# Patient Record
Sex: FEMALE | Race: WHITE | NOT HISPANIC OR LATINO | Employment: UNEMPLOYED | URBAN - METROPOLITAN AREA
[De-identification: names, ages, dates, MRNs, and addresses within clinical notes are randomized per-mention and may not be internally consistent; named-entity substitution may affect disease eponyms.]

---

## 2018-03-31 ENCOUNTER — APPOINTMENT (OUTPATIENT)
Dept: GENERAL RADIOLOGY | Facility: HOSPITAL | Age: 38
End: 2018-03-31

## 2018-03-31 ENCOUNTER — HOSPITAL ENCOUNTER (EMERGENCY)
Facility: HOSPITAL | Age: 38
Discharge: HOME | End: 2018-03-31
Attending: EMERGENCY MEDICINE | Admitting: EMERGENCY MEDICINE

## 2018-03-31 VITALS
BODY MASS INDEX: 41.72 KG/M2 | RESPIRATION RATE: 18 BRPM | DIASTOLIC BLOOD PRESSURE: 92 MMHG | WEIGHT: 221 LBS | TEMPERATURE: 99.1 F | OXYGEN SATURATION: 99 % | SYSTOLIC BLOOD PRESSURE: 142 MMHG | HEIGHT: 61 IN | HEART RATE: 97 BPM

## 2018-03-31 DIAGNOSIS — S82.891A CLOSED FRACTURE OF RIGHT ANKLE, INITIAL ENCOUNTER: ICD-10-CM

## 2018-03-31 DIAGNOSIS — S82.892A CLOSED FRACTURE OF LEFT ANKLE, INITIAL ENCOUNTER: Primary | ICD-10-CM

## 2018-03-31 PROCEDURE — 73610 X-RAY EXAM OF ANKLE: CPT

## 2018-03-31 PROCEDURE — 99284 EMERGENCY DEPT VISIT MOD MDM: CPT

## 2018-03-31 RX ORDER — ASCORBIC ACID 500 MG
1 TABLET ORAL DAILY
COMMUNITY
End: 2018-04-04

## 2018-03-31 RX ORDER — HYDROCODONE BITARTRATE AND ACETAMINOPHEN 5; 325 MG/1; MG/1
1 TABLET ORAL EVERY 6 HOURS PRN
Qty: 15 TABLET | Refills: 0 | Status: SHIPPED | OUTPATIENT
Start: 2018-03-31 | End: 2018-04-03 | Stop reason: SDUPTHER

## 2018-04-03 ENCOUNTER — TELEPHONE (OUTPATIENT)
Dept: ORTHOPEDIC SURGERY | Facility: CLINIC | Age: 38
End: 2018-04-03

## 2018-04-03 ENCOUNTER — OFFICE VISIT (OUTPATIENT)
Dept: ORTHOPEDIC SURGERY | Facility: CLINIC | Age: 38
End: 2018-04-03

## 2018-04-03 VITALS
TEMPERATURE: 97.8 F | HEIGHT: 61 IN | BODY MASS INDEX: 40.22 KG/M2 | DIASTOLIC BLOOD PRESSURE: 80 MMHG | WEIGHT: 213 LBS | SYSTOLIC BLOOD PRESSURE: 128 MMHG | HEART RATE: 96 BPM

## 2018-04-03 DIAGNOSIS — S82.62XA CLOSED DISPLACED FRACTURE OF LATERAL MALLEOLUS OF LEFT FIBULA, INITIAL ENCOUNTER: Primary | ICD-10-CM

## 2018-04-03 DIAGNOSIS — S93.432A ANKLE SYNDESMOSIS DISRUPTION, LEFT, INITIAL ENCOUNTER: ICD-10-CM

## 2018-04-03 DIAGNOSIS — S82.61XA CLOSED AVULSION FRACTURE OF LATERAL MALLEOLUS OF RIGHT FIBULA, INITIAL ENCOUNTER: ICD-10-CM

## 2018-04-03 PROCEDURE — 27786 TREATMENT OF ANKLE FRACTURE: CPT | Performed by: ORTHOPAEDIC SURGERY

## 2018-04-03 PROCEDURE — 99204 OFFICE O/P NEW MOD 45 MIN: CPT | Performed by: ORTHOPAEDIC SURGERY

## 2018-04-03 RX ORDER — HYDROCODONE BITARTRATE AND ACETAMINOPHEN 5; 325 MG/1; MG/1
TABLET ORAL
Qty: 60 TABLET | Refills: 0 | Status: SHIPPED | OUTPATIENT
Start: 2018-04-03 | End: 2018-07-16

## 2018-04-03 RX ORDER — CLINDAMYCIN PHOSPHATE 900 MG/50ML
900 INJECTION INTRAVENOUS ONCE
Status: CANCELLED | OUTPATIENT
Start: 2018-04-06 | End: 2018-04-06

## 2018-04-03 NOTE — PROGRESS NOTES
"Patient:  Veena Beyer is a 37 y.o. female    Chief Complaint/ Reason for Visit:    Chief Complaint   Patient presents with   • Left Ankle - Establish Care, Pain, Joint Swelling, Injury   • Right Ankle - Establish Care, Pain, Joint Swelling, Injury       HPI:  This patient presents today, accompanied by her , complaining of bilateral ankle pain.  She says that she was going down the steps to her basement and she missed the last step falling and injuring both of her ankles.  She felt and heard a loud pop in her left ankle, and had the immediate onset of severe pain and swelling in her left ankle.  She also had moderate pain in her right ankle, but can actually bear weight on it, unlike the left ankle.  She was seen in the emergency Department over at [facility] on March 31 and diagnosed with fractures of both ankles.  The right ankle seemed relatively minor and was treated in a fracture splint.  The left ankle was a much more serious injury and she was placed in a well-padded posterior splint.  She still has moderate pain in the left ankle and mild pain in the right ankle.  She has pain with any weightbearing in the right ankle though it's relatively mild.  She has throbbing pain in her left ankle which improves with elevation and rest.  She has been taking pain medicine that she was prescribed and that seems to help the discomfort somewhat.  She does not have a history of any prior serious injury to either of her ankles that she can recall at this time.    The patient denies any calf pain, chest pain, or acute shortness of breath.  She is not having any numbness, tingling, or sensory derangements in her right lower extremity, but does complain of some mild \"numbness\" in the toes of her left foot that she attributes to swelling.    She denies any other musculoskeletal injuries or complaints acutely at this time.  She does not smoke, nor does she take any exogenous hormone's, nor does she have any " "implantable birth control or hormone eluting devices..  She has no history of DVT.    The patient and her  have a 1-year-old child.  The patient says she is not currently pregnant.      PMH:    Past Medical History:   Diagnosis Date   • Ankle fracture, left        PSH:    Past Surgical History:   Procedure Laterality Date   • WISDOM TOOTH EXTRACTION  1997       Social Hx:    Social History     Social History   • Marital status:      Spouse name: N/A   • Number of children: N/A   • Years of education: N/A     Occupational History   • Not on file.     Social History Main Topics   • Smoking status: Never Smoker   • Smokeless tobacco: Not on file   • Alcohol use Yes      Comment: OCC   • Drug use: No   • Sexual activity: Defer     Other Topics Concern   • Not on file     Social History Narrative   • No narrative on file       Family Hx:  History reviewed. No pertinent family history.    Meds:    Current Outpatient Prescriptions:   •  HYDROcodone-acetaminophen (NORCO) 5-325 MG per tablet, May take 1 or 2 by mouth every 6 hours as needed for pain postoperatively., Disp: 60 tablet, Rfl: 0  •  Prenatal Vit-Fe Fumarate-FA (PRENATAL PO), Take  by mouth., Disp: , Rfl:     Allergies:    Allergies   Allergen Reactions   • Penicillins Hives       ROS:  Review of Systems   Constitutional: Positive for activity change.   Respiratory: Negative for shortness of breath.    Musculoskeletal: Positive for arthralgias, gait problem and joint swelling.   Neurological: Positive for numbness.   All other systems reviewed and are negative.      Vitals:    04/03/18 1041   BP: 128/80   BP Location: Left arm   Patient Position: Sitting   Cuff Size: Large Adult   Pulse: 96   Temp: 97.8 °F (36.6 °C)   TempSrc: Temporal Artery    Weight: 96.6 kg (213 lb)   Height: 154.9 cm (61\")   PainSc:   5   PainLoc: Ankle     Body mass index is 40.25 kg/m².    Physical Exam   Constitutional: She is oriented to person, place, and time. She appears " well-developed and well-nourished.   HENT:   Head: Normocephalic and atraumatic.   Mouth/Throat: Oropharynx is clear and moist.   Eyes: Conjunctivae and EOM are normal. Pupils are equal, round, and reactive to light. No scleral icterus.   Neck: Phonation normal. No JVD present. Carotid bruit is not present. No tracheal deviation present.   Cardiovascular: Normal rate, regular rhythm, normal heart sounds and intact distal pulses.    Pulses:       Dorsalis pedis pulses are 2+ on the right side, and 2+ on the left side.        Posterior tibial pulses are 2+ on the right side, and 2+ on the left side.   Pulmonary/Chest: Effort normal and breath sounds normal. No stridor. No respiratory distress.   Musculoskeletal:        Right ankle: She exhibits swelling. She exhibits normal range of motion, no ecchymosis, no deformity, no laceration and normal pulse. Tenderness. Lateral malleolus tenderness found. No medial malleolus, no head of 5th metatarsal and no proximal fibula tenderness found.        Left ankle: She exhibits decreased range of motion, swelling and ecchymosis. She exhibits no laceration and normal pulse. Tenderness. Lateral malleolus and medial malleolus tenderness found. No head of 5th metatarsal and no proximal fibula tenderness found.        Left lower leg: She exhibits no tenderness.   The patient has mild to moderate diffuse swelling of the left ankle.  Active ranges of motion are intact, however.  On eversion, she does have some early skin wrinkling, but I would like to see her swelling cody a bit more before proceeding with surgical treatment.    Her left calf is soft and nontender with no venous cord.   Neurological: She is alert and oriented to person, place, and time.   Skin: Skin is warm and dry. No rash noted. No cyanosis or erythema. Nails show no clubbing.   Psychiatric: She has a normal mood and affect. Her speech is normal and behavior is normal. Judgment and thought content normal.   Nursing  note and vitals reviewed.              Radiology:  X-rays: 3 views of the left ankle and 3 views of the right ankle dated March 31 on the [place] system were reviewed.  Comparison images were not available.  The 3 right ankle images reveal what is likely a small avulsion fracture of the lateral malleolus that is acute, and a small, smooth round ossicle beneath the medial malleolus that is perhaps due to an old injury or a small accessory ossicle of some sort.  Mild soft tissue swelling is noted.    The 3 views of the left ankle reveal the patient has a spiral fracture of the distal fibula and has significant widening of the ankle mortise indicative of a syndesmotic injury in addition to the distal fibular fracture.  Moderate soft tissue swelling is noted.          Assessment:       Diagnosis Plan   1. Closed displaced fracture of lateral malleolus of left fibula, initial encounter  Case Request    clindamycin (CLEOCIN) 900 mg in dextrose 5% 50 mL IVPB (premix)    Case Request   2. Ankle syndesmosis disruption, left, initial encounter  Case Request    clindamycin (CLEOCIN) 900 mg in dextrose 5% 50 mL IVPB (premix)    Case Request   3. Closed avulsion fracture of lateral malleolus of right fibula, initial encounter             Plan:       I discussed everything with the patient and her .  We discussed that the right ankle injury seemed to be fairly minor and stable.  The patient was comfortable in a fracture splint on that side, and I think she can safely continue to bear weight on the right lower extremity as comfort dictates.  I think the right ankle will likely heal uneventfully without any surgical intervention.  It is certainly a relatively minor injury compared to her left ankle.    We reviewed the treatment options for the left ankle specifically, both surgical and nonsurgical, the potential outcomes good and bad of these options, and the pluses and minuses, risks and benefits thereof.  I reviewed that  surgery had risks, and that surgery had no guarantees.  I advised the patient that nonsurgical management of this fracture may not result in a good outcome.  I explained the elements of the fracture that made me concerned that nonsurgical management was probably not the best option, though again, I emphasized that there was no guarantee of a good outcome with surgical treatment.  We reviewed that the risks of surgery include but are not limited to bleeding, infection, injury to nerves, blood vessels, tendons, ligaments, or other soft tissue structures, skin wound problems or skin breakdown, permanent pain, permanent stiffness, permanent limping, and/or permanent swelling, the need for future surgical procedures, blood clots, arthritis, pulmonary embolism, pneumonia, stroke, heart attack, amputation, and even death due to these or other complications.  We reviewed that metallic implants would be necessary, and we reviewed that the fracture could fail to heal, or that the implants themselves could fail, or that the fixation could fail, any of which could necessitate additional surgical procedures.    The patient and her  voiced understanding.  All their questions were answered to their satisfaction.  I even told them that they could certainly obtain a second opinion and even offered to facilitate a second opinion for them.  They have declined.    The patient and her  indicated that she has decided to proceed with surgical treatment of her left ankle injury.    Narcotic counseling was performed in the usual fashion, and I emphasized the risks of narcotic use and the dangers. We discussed the potential for dependency and addiction, and we discussed things to avoid when taking these medications including specific activities to avoid, as well as avoidance of other sedating substances including alcohol or other medications.     A [place] report was checked and personally reviewed by me, and the patient  confirmed that they had reviewed the house bill 1 warnings provided. Appropriate narcotic pain medication was issued.    Postoperative recommendations, restrictions, and precautions were reviewed.  All of their questions regarding recovery, activity limitations, etc. were answered.  I emphasized the importance of elevation both preoperatively, as well as during the first week postoperatively, and we discussed the reasons for this.  We also discussed that she would need to take an 81 mg coated aspirin twice daily after surgery, once after breakfast and once after the evening meal.  We discussed that this was to reduce the likelihood of blood clots.  They voiced understanding.            Orders Placed This Encounter   Procedures   • Follow anesthesia standing orders.   • Provide instructions to patient regarding NPO status   • Obtain informed consent     Order Specific Question:   Informed Consent Given For     Answer:   Surgical reduction and internal fixation of the left ankle fracture, with reduction and fixation of the syndesmosis injury as well     Order Specific Question:   Laterality     Answer:   Left

## 2018-04-03 NOTE — TELEPHONE ENCOUNTER
I gave them a prescription for a bedside commode and a shower chair when they were sitting in the office.  I hand wrote it and signed it personally.

## 2018-04-03 NOTE — TELEPHONE ENCOUNTER
Spoke with pt's  and he will stop by later today or in the morning to  the order; I have placed it at the .

## 2018-04-04 ENCOUNTER — APPOINTMENT (OUTPATIENT)
Dept: PREADMISSION TESTING | Facility: HOSPITAL | Age: 38
End: 2018-04-04

## 2018-04-04 VITALS
HEIGHT: 61 IN | RESPIRATION RATE: 18 BRPM | BODY MASS INDEX: 40.22 KG/M2 | DIASTOLIC BLOOD PRESSURE: 86 MMHG | TEMPERATURE: 97.3 F | HEART RATE: 85 BPM | WEIGHT: 213 LBS | OXYGEN SATURATION: 100 % | SYSTOLIC BLOOD PRESSURE: 120 MMHG

## 2018-04-04 PROBLEM — S82.61XA CLOSED AVULSION FRACTURE OF LATERAL MALLEOLUS OF RIGHT FIBULA: Status: ACTIVE | Noted: 2018-04-04

## 2018-04-04 LAB
ANION GAP SERPL CALCULATED.3IONS-SCNC: 15.4 MMOL/L
BUN BLD-MCNC: 14 MG/DL (ref 6–20)
BUN/CREAT SERPL: 18.4 (ref 7–25)
CALCIUM SPEC-SCNC: 9.7 MG/DL (ref 8.6–10.5)
CHLORIDE SERPL-SCNC: 98 MMOL/L (ref 98–107)
CO2 SERPL-SCNC: 24.6 MMOL/L (ref 22–29)
CREAT BLD-MCNC: 0.76 MG/DL (ref 0.57–1)
DEPRECATED RDW RBC AUTO: 43.3 FL (ref 37–54)
ERYTHROCYTE [DISTWIDTH] IN BLOOD BY AUTOMATED COUNT: 12.6 % (ref 11.7–13)
GFR SERPL CREATININE-BSD FRML MDRD: 86 ML/MIN/1.73
GLUCOSE BLD-MCNC: 112 MG/DL (ref 65–99)
HCG SERPL QL: NEGATIVE
HCT VFR BLD AUTO: 42.8 % (ref 35.6–45.5)
HGB BLD-MCNC: 13.5 G/DL (ref 11.9–15.5)
MCH RBC QN AUTO: 29.7 PG (ref 26.9–32)
MCHC RBC AUTO-ENTMCNC: 31.5 G/DL (ref 32.4–36.3)
MCV RBC AUTO: 94.3 FL (ref 80.5–98.2)
PLATELET # BLD AUTO: 283 10*3/MM3 (ref 140–500)
PMV BLD AUTO: 10.1 FL (ref 6–12)
POTASSIUM BLD-SCNC: 4.1 MMOL/L (ref 3.5–5.2)
RBC # BLD AUTO: 4.54 10*6/MM3 (ref 3.9–5.2)
SODIUM BLD-SCNC: 138 MMOL/L (ref 136–145)
WBC NRBC COR # BLD: 6.69 10*3/MM3 (ref 4.5–10.7)

## 2018-04-04 PROCEDURE — 93005 ELECTROCARDIOGRAM TRACING: CPT

## 2018-04-04 PROCEDURE — 93010 ELECTROCARDIOGRAM REPORT: CPT | Performed by: INTERNAL MEDICINE

## 2018-04-04 PROCEDURE — 85027 COMPLETE CBC AUTOMATED: CPT | Performed by: ORTHOPAEDIC SURGERY

## 2018-04-04 PROCEDURE — 80048 BASIC METABOLIC PNL TOTAL CA: CPT | Performed by: ORTHOPAEDIC SURGERY

## 2018-04-04 PROCEDURE — 84703 CHORIONIC GONADOTROPIN ASSAY: CPT | Performed by: ORTHOPAEDIC SURGERY

## 2018-04-04 PROCEDURE — 36415 COLL VENOUS BLD VENIPUNCTURE: CPT

## 2018-04-04 NOTE — DISCHARGE INSTRUCTIONS
Take the following medications the morning of surgery with a small sip of water:        General Instructions:  • Do not eat solid food after midnight the night before surgery.  • You may drink clear liquids day of surgery but must stop at least one hour before your hospital arrival time.  • It is beneficial for you to have a clear drink that contains carbohydrates the day of surgery.  We suggest a 12 to 20 ounce bottle of Gatorade or Powerade for non-diabetic patients or a 12 to 20 ounce bottle of G2 or Powerade Zero for diabetic patients. (Pediatric patients, are not advised to drink a 12 to 20 ounce carbohydrate drink)    Clear liquids are liquids you can see through.  Nothing red in color.     Plain water                               Sports drinks  Sodas                                   Gelatin (Jell-O)  Fruit juices without pulp such as white grape juice and apple juice  Popsicles that contain no fruit or yogurt  Tea or coffee (no cream or milk added)  Gatorade / Powerade  G2 / Powerade Zero    • Infants may have breast milk up to four hours before surgery.  • Infants drinking formula may drink formula up to six hours before surgery.   • Patients who avoid smoking, chewing tobacco and alcohol for 4 weeks prior to surgery have a reduced risk of post-operative complications.  Quit smoking as many days before surgery as you can.  • Do not smoke, use chewing tobacco or drink alcohol the day of surgery.   • If applicable bring your C-PAP/ BI-PAP machine.  • Bring any papers given to you in the doctor’s office.  • Wear clean comfortable clothes and socks.  • Do not wear contact lenses or make-up.  Bring a case for your glasses.   • Bring crutches or walker if applicable.  • Remove all piercings.  Leave jewelry and any other valuables at home.  • Hair extensions with metal clips must be removed prior to surgery.  • The Pre-Admission Testing nurse will instruct you to bring medications if unable to obtain an accurate  list in Pre-Admission Testing.        If you were given a blood bank ID arm band remember to bring it with you the day of surgery.    Preventing a Surgical Site Infection:  • For 2 to 3 days before surgery, avoid shaving with a razor because the razor can irritate skin and make it easier to develop an infection.  • The night prior to surgery sleep in a clean bed with clean clothing.  Do not allow pets to sleep with you.  • Shower on the morning of surgery using a fresh bar of anti-bacterial soap (such as Dial) and clean washcloth.  Dry with a clean towel and dress in clean clothing.  • Ask your surgeon if you will be receiving antibiotics prior to surgery.  • Make sure you, your family, and all healthcare providers clean their hands with soap and water or an alcohol based hand  before caring for you or your wound.    Day of surgery:  Upon arrival, a Pre-op nurse and Anesthesiologist will review your health history, obtain vital signs, and answer questions you may have.  The only belongings needed at this time will be your home medications and if applicable your C-PAP/BI-PAP machine.  If you are staying overnight your family can leave the rest of your belongings in the car and bring them to your room later.  A Pre-op nurse will start an IV and you may receive medication in preparation for surgery, including something to help you relax.  Your family will be able to see you in the Pre-op area.  While you are in surgery your family should notify the waiting room  if they leave the waiting room area and provide a contact phone number.    Please be aware that surgery does come with discomfort.  We want to make every effort to control your discomfort so please discuss any uncontrolled symptoms with your nurse.   Your doctor will most likely have prescribed pain medications.      If you are going home after surgery you will receive individualized written care instructions before being discharged.  A  responsible adult must drive you to and from the hospital on the day of your surgery and stay with you for 24 hours.    If you are staying overnight following surgery, you will be transported to your hospital room following the recovery period.  [facility] has all private rooms.    If you have any questions please call Pre-Admission Testing at [phone].  Deductibles and co-payments are collected on the day of service. Please be prepared to pay the required co-pay, deductible or deposit on the day of service as defined by your plan.

## 2018-04-06 ENCOUNTER — APPOINTMENT (OUTPATIENT)
Dept: GENERAL RADIOLOGY | Facility: HOSPITAL | Age: 38
End: 2018-04-06

## 2018-04-06 ENCOUNTER — HOSPITAL ENCOUNTER (OUTPATIENT)
Facility: HOSPITAL | Age: 38
Setting detail: HOSPITAL OUTPATIENT SURGERY
Discharge: HOME | End: 2018-04-06
Attending: ORTHOPAEDIC SURGERY | Admitting: ORTHOPAEDIC SURGERY

## 2018-04-06 ENCOUNTER — ANESTHESIA (OUTPATIENT)
Dept: PERIOP | Facility: HOSPITAL | Age: 38
End: 2018-04-06

## 2018-04-06 ENCOUNTER — ANESTHESIA EVENT (OUTPATIENT)
Dept: PERIOP | Facility: HOSPITAL | Age: 38
End: 2018-04-06

## 2018-04-06 VITALS
HEART RATE: 92 BPM | SYSTOLIC BLOOD PRESSURE: 137 MMHG | HEIGHT: 61 IN | OXYGEN SATURATION: 97 % | BODY MASS INDEX: 40.22 KG/M2 | DIASTOLIC BLOOD PRESSURE: 88 MMHG | WEIGHT: 213 LBS | TEMPERATURE: 97.9 F | RESPIRATION RATE: 16 BRPM

## 2018-04-06 DIAGNOSIS — S82.62XA CLOSED DISPLACED FRACTURE OF LATERAL MALLEOLUS OF LEFT FIBULA, INITIAL ENCOUNTER: ICD-10-CM

## 2018-04-06 DIAGNOSIS — S93.432A ANKLE SYNDESMOSIS DISRUPTION, LEFT, INITIAL ENCOUNTER: ICD-10-CM

## 2018-04-06 PROCEDURE — C1713 ANCHOR/SCREW BN/BN,TIS/BN: HCPCS | Performed by: ORTHOPAEDIC SURGERY

## 2018-04-06 PROCEDURE — 25010000002 MIDAZOLAM PER 1 MG: Performed by: ANESTHESIOLOGY

## 2018-04-06 PROCEDURE — 73610 X-RAY EXAM OF ANKLE: CPT

## 2018-04-06 PROCEDURE — 76000 FLUOROSCOPY <1 HR PHYS/QHP: CPT

## 2018-04-06 PROCEDURE — 25010000002 PROPOFOL 10 MG/ML EMULSION: Performed by: ANESTHESIOLOGY

## 2018-04-06 PROCEDURE — 25010000002 FENTANYL CITRATE (PF) 100 MCG/2ML SOLUTION: Performed by: ANESTHESIOLOGY

## 2018-04-06 PROCEDURE — 27829 TREAT LOWER LEG JOINT: CPT | Performed by: ORTHOPAEDIC SURGERY

## 2018-04-06 PROCEDURE — 25010000002 DEXAMETHASONE PER 1 MG: Performed by: ANESTHESIOLOGY

## 2018-04-06 PROCEDURE — 27792 TREATMENT OF ANKLE FRACTURE: CPT | Performed by: ORTHOPAEDIC SURGERY

## 2018-04-06 PROCEDURE — 25010000002 ONDANSETRON PER 1 MG: Performed by: ANESTHESIOLOGY

## 2018-04-06 DEVICE — SCRW LK ST STRDRV 2.7X12MM: Type: IMPLANTABLE DEVICE | Site: ANKLE | Status: FUNCTIONAL

## 2018-04-06 DEVICE — SCRW CORT S/TAP 3.5X50MM: Type: IMPLANTABLE DEVICE | Site: ANKLE | Status: FUNCTIONAL

## 2018-04-06 DEVICE — SCRW LK ST STRDRV 2.7X16MM: Type: IMPLANTABLE DEVICE | Site: ANKLE | Status: FUNCTIONAL

## 2018-04-06 DEVICE — SCRW CORT S/TAP 3.5X12MM: Type: IMPLANTABLE DEVICE | Site: ANKLE | Status: FUNCTIONAL

## 2018-04-06 DEVICE — SCRW LK ST STRDRV 2.7X14MM: Type: IMPLANTABLE DEVICE | Site: ANKLE | Status: FUNCTIONAL

## 2018-04-06 DEVICE — SCRW LK S/TAP STRDRV 3.5X14MM: Type: IMPLANTABLE DEVICE | Site: ANKLE | Status: FUNCTIONAL

## 2018-04-06 DEVICE — WASHR SCRW SM 7.0MM: Type: IMPLANTABLE DEVICE | Site: ANKLE | Status: FUNCTIONAL

## 2018-04-06 DEVICE — SCRW CORT S/TAP 2.7X20MM: Type: IMPLANTABLE DEVICE | Site: ANKLE | Status: FUNCTIONAL

## 2018-04-06 DEVICE — IMPLANTABLE DEVICE: Type: IMPLANTABLE DEVICE | Site: ANKLE | Status: FUNCTIONAL

## 2018-04-06 RX ORDER — PROMETHAZINE HYDROCHLORIDE 25 MG/ML
12.5 INJECTION, SOLUTION INTRAMUSCULAR; INTRAVENOUS ONCE AS NEEDED
Status: DISCONTINUED | OUTPATIENT
Start: 2018-04-06 | End: 2018-04-06 | Stop reason: HOSPADM

## 2018-04-06 RX ORDER — SODIUM CHLORIDE, SODIUM LACTATE, POTASSIUM CHLORIDE, CALCIUM CHLORIDE 600; 310; 30; 20 MG/100ML; MG/100ML; MG/100ML; MG/100ML
9 INJECTION, SOLUTION INTRAVENOUS CONTINUOUS PRN
Status: DISCONTINUED | OUTPATIENT
Start: 2018-04-06 | End: 2018-04-06 | Stop reason: HOSPADM

## 2018-04-06 RX ORDER — CLINDAMYCIN PHOSPHATE 900 MG/50ML
900 INJECTION INTRAVENOUS ONCE
Status: DISCONTINUED | OUTPATIENT
Start: 2018-04-06 | End: 2018-04-06 | Stop reason: HOSPADM

## 2018-04-06 RX ORDER — FENTANYL CITRATE 50 UG/ML
50 INJECTION, SOLUTION INTRAMUSCULAR; INTRAVENOUS
Status: DISCONTINUED | OUTPATIENT
Start: 2018-04-06 | End: 2018-04-06 | Stop reason: HOSPADM

## 2018-04-06 RX ORDER — ONDANSETRON 2 MG/ML
4 INJECTION INTRAMUSCULAR; INTRAVENOUS ONCE AS NEEDED
Status: DISCONTINUED | OUTPATIENT
Start: 2018-04-06 | End: 2018-04-06 | Stop reason: HOSPADM

## 2018-04-06 RX ORDER — SODIUM CHLORIDE 0.9 % (FLUSH) 0.9 %
1-10 SYRINGE (ML) INJECTION AS NEEDED
Status: DISCONTINUED | OUTPATIENT
Start: 2018-04-06 | End: 2018-04-06 | Stop reason: HOSPADM

## 2018-04-06 RX ORDER — EPHEDRINE SULFATE 50 MG/ML
5 INJECTION, SOLUTION INTRAVENOUS ONCE AS NEEDED
Status: DISCONTINUED | OUTPATIENT
Start: 2018-04-06 | End: 2018-04-06 | Stop reason: HOSPADM

## 2018-04-06 RX ORDER — PROMETHAZINE HYDROCHLORIDE 25 MG/1
25 TABLET ORAL ONCE AS NEEDED
Status: DISCONTINUED | OUTPATIENT
Start: 2018-04-06 | End: 2018-04-06 | Stop reason: HOSPADM

## 2018-04-06 RX ORDER — PROPOFOL 10 MG/ML
VIAL (ML) INTRAVENOUS AS NEEDED
Status: DISCONTINUED | OUTPATIENT
Start: 2018-04-06 | End: 2018-04-06 | Stop reason: SURG

## 2018-04-06 RX ORDER — LIDOCAINE HYDROCHLORIDE 20 MG/ML
INJECTION, SOLUTION INFILTRATION; PERINEURAL AS NEEDED
Status: DISCONTINUED | OUTPATIENT
Start: 2018-04-06 | End: 2018-04-06 | Stop reason: SURG

## 2018-04-06 RX ORDER — HYDROCODONE BITARTRATE AND ACETAMINOPHEN 7.5; 325 MG/1; MG/1
1 TABLET ORAL ONCE AS NEEDED
Status: CANCELLED | OUTPATIENT
Start: 2018-04-06 | End: 2018-04-16

## 2018-04-06 RX ORDER — HYDRALAZINE HYDROCHLORIDE 20 MG/ML
5 INJECTION INTRAMUSCULAR; INTRAVENOUS
Status: DISCONTINUED | OUTPATIENT
Start: 2018-04-06 | End: 2018-04-06 | Stop reason: HOSPADM

## 2018-04-06 RX ORDER — OXYCODONE AND ACETAMINOPHEN 7.5; 325 MG/1; MG/1
1 TABLET ORAL ONCE AS NEEDED
Status: DISCONTINUED | OUTPATIENT
Start: 2018-04-06 | End: 2018-04-06 | Stop reason: HOSPADM

## 2018-04-06 RX ORDER — PROMETHAZINE HYDROCHLORIDE 25 MG/1
25 SUPPOSITORY RECTAL ONCE AS NEEDED
Status: DISCONTINUED | OUTPATIENT
Start: 2018-04-06 | End: 2018-04-06 | Stop reason: HOSPADM

## 2018-04-06 RX ORDER — HYDROCODONE BITARTRATE AND ACETAMINOPHEN 7.5; 325 MG/1; MG/1
1 TABLET ORAL ONCE AS NEEDED
Status: DISCONTINUED | OUTPATIENT
Start: 2018-04-06 | End: 2018-04-06 | Stop reason: HOSPADM

## 2018-04-06 RX ORDER — PROMETHAZINE HYDROCHLORIDE 25 MG/1
12.5 TABLET ORAL ONCE AS NEEDED
Status: DISCONTINUED | OUTPATIENT
Start: 2018-04-06 | End: 2018-04-06 | Stop reason: HOSPADM

## 2018-04-06 RX ORDER — LABETALOL HYDROCHLORIDE 5 MG/ML
5 INJECTION, SOLUTION INTRAVENOUS
Status: DISCONTINUED | OUTPATIENT
Start: 2018-04-06 | End: 2018-04-06 | Stop reason: HOSPADM

## 2018-04-06 RX ORDER — MIDAZOLAM HYDROCHLORIDE 1 MG/ML
1 INJECTION INTRAMUSCULAR; INTRAVENOUS
Status: DISCONTINUED | OUTPATIENT
Start: 2018-04-06 | End: 2018-04-06 | Stop reason: HOSPADM

## 2018-04-06 RX ORDER — DEXAMETHASONE SODIUM PHOSPHATE 10 MG/ML
INJECTION INTRAMUSCULAR; INTRAVENOUS AS NEEDED
Status: DISCONTINUED | OUTPATIENT
Start: 2018-04-06 | End: 2018-04-06 | Stop reason: SURG

## 2018-04-06 RX ORDER — HYDROMORPHONE HCL 110MG/55ML
0.5 PATIENT CONTROLLED ANALGESIA SYRINGE INTRAVENOUS
Status: DISCONTINUED | OUTPATIENT
Start: 2018-04-06 | End: 2018-04-06 | Stop reason: HOSPADM

## 2018-04-06 RX ORDER — NALOXONE HCL 0.4 MG/ML
0.2 VIAL (ML) INJECTION AS NEEDED
Status: DISCONTINUED | OUTPATIENT
Start: 2018-04-06 | End: 2018-04-06 | Stop reason: HOSPADM

## 2018-04-06 RX ORDER — MIDAZOLAM HYDROCHLORIDE 1 MG/ML
2 INJECTION INTRAMUSCULAR; INTRAVENOUS
Status: DISCONTINUED | OUTPATIENT
Start: 2018-04-06 | End: 2018-04-06 | Stop reason: HOSPADM

## 2018-04-06 RX ORDER — DIPHENHYDRAMINE HYDROCHLORIDE 50 MG/ML
12.5 INJECTION INTRAMUSCULAR; INTRAVENOUS
Status: DISCONTINUED | OUTPATIENT
Start: 2018-04-06 | End: 2018-04-06 | Stop reason: HOSPADM

## 2018-04-06 RX ORDER — GLYCOPYRROLATE 0.2 MG/ML
INJECTION INTRAMUSCULAR; INTRAVENOUS AS NEEDED
Status: DISCONTINUED | OUTPATIENT
Start: 2018-04-06 | End: 2018-04-06 | Stop reason: SURG

## 2018-04-06 RX ORDER — FLUMAZENIL 0.1 MG/ML
0.2 INJECTION INTRAVENOUS AS NEEDED
Status: DISCONTINUED | OUTPATIENT
Start: 2018-04-06 | End: 2018-04-06 | Stop reason: HOSPADM

## 2018-04-06 RX ORDER — FAMOTIDINE 10 MG/ML
20 INJECTION, SOLUTION INTRAVENOUS ONCE
Status: COMPLETED | OUTPATIENT
Start: 2018-04-06 | End: 2018-04-06

## 2018-04-06 RX ORDER — ONDANSETRON 2 MG/ML
INJECTION INTRAMUSCULAR; INTRAVENOUS AS NEEDED
Status: DISCONTINUED | OUTPATIENT
Start: 2018-04-06 | End: 2018-04-06 | Stop reason: SURG

## 2018-04-06 RX ADMIN — PROPOFOL 50 MG: 10 INJECTION, EMULSION INTRAVENOUS at 14:12

## 2018-04-06 RX ADMIN — MIDAZOLAM 2 MG: 1 INJECTION INTRAMUSCULAR; INTRAVENOUS at 12:55

## 2018-04-06 RX ADMIN — PROPOFOL 150 MG: 10 INJECTION, EMULSION INTRAVENOUS at 14:10

## 2018-04-06 RX ADMIN — ONDANSETRON 4 MG: 2 INJECTION INTRAMUSCULAR; INTRAVENOUS at 14:17

## 2018-04-06 RX ADMIN — DEXAMETHASONE SODIUM PHOSPHATE 8 MG: 10 INJECTION INTRAMUSCULAR; INTRAVENOUS at 14:10

## 2018-04-06 RX ADMIN — FENTANYL CITRATE 50 MCG: 50 INJECTION INTRAMUSCULAR; INTRAVENOUS at 16:45

## 2018-04-06 RX ADMIN — GLYCOPYRROLATE 0.1 MG: 0.2 INJECTION INTRAMUSCULAR; INTRAVENOUS at 14:20

## 2018-04-06 RX ADMIN — SODIUM CHLORIDE, POTASSIUM CHLORIDE, SODIUM LACTATE AND CALCIUM CHLORIDE 9 ML/HR: 600; 310; 30; 20 INJECTION, SOLUTION INTRAVENOUS at 12:38

## 2018-04-06 RX ADMIN — LIDOCAINE HYDROCHLORIDE 100 MG: 20 INJECTION, SOLUTION INFILTRATION; PERINEURAL at 14:10

## 2018-04-06 RX ADMIN — FAMOTIDINE 20 MG: 10 INJECTION INTRAVENOUS at 12:38

## 2018-04-06 NOTE — ANESTHESIA PROCEDURE NOTES
Airway  Airway not difficult    General Information and Staff    Anesthesiologist: VIVEK ARIAS    Indications and Patient Condition    Preoxygenated: yes      Final Airway Details  Final airway type: supraglottic airway      Successful airway: unique  Size 4

## 2018-04-06 NOTE — OP NOTE
ANKLE OPEN REDUCTION INTERNAL FIXATION  Progress Note    Veena Beyer  4/6/2018    Pre-op Diagnosis:   Closed displaced fracture of lateral malleolus of left fibula, initial encounter [S82.62XA]  Ankle syndesmosis disruption, left, initial encounter [S93.432A]       Post-Op Diagnosis Codes:     * Closed displaced fracture of lateral malleolus of left fibula, initial encounter [S82.62XA]     * Ankle syndesmosis disruption, left, initial encounter [S93.432A]    Procedure/CPT® Codes:      Procedure(s):  1)  open reduction internal fixation of the displaced fracture of the lateral malleolus, left ankle;  2)  surgical reduction and internal fixation of the left ankle syndesmosis disruption, both procedures being accomplished with Synthes locking distal fibular plate and screws          Surgeon(s):  Mohinder Key MD    Anesthesia: General with Block    Staff:   Circulator: Swetha Joyce RN; Pau Carter RN  Radiology Technologist: Adeline Gann RRT  Scrub Person: Ray Donis  Vendor Representative: Sanjeev Ocampo    Estimated Blood Loss: 20 mL    Urine Voided: * No values recorded between 4/6/2018  2:03 PM and 4/6/2018  4:03 PM *    Specimens:                None      Drains:  none      Findings: Bone quality was satisfactory.    Complications: None noted intraoperatively    Technique:  The patient was taken to the operating room and placed comfortably supine on the table.  The preoperative pause was conducted in usual fashion to confirm that everything was correct.  Appropriate IV antibiotics were confirmed administered.  General anesthesia was induced without difficulty.  Meticulous attention was given a comfortable position careful padding of all extremities and bony prominences.  The entire case was carried out with the bed in Trendelenburg position to help control swelling.  A tourniquet was applied high on the thigh over a layer of padding.  The left lower extremity was then prepped and draped in the  usual sterile fashion from the knee, distally.    A longitudinal incision was now made on the lateral aspect of the ankle, centered over the fracture line.  The soft tissues were meticulously and gently handled throughout the case.  Spreading dissection was carried out, and subperiosteal exposure of the fracture line carried out.  Soft tissue disruption and periosteal dissection was kept to a minimum.  The fracture line was carefully debrided of hematoma, and periosteum was removed from the fracture line as necessary.  The wound was irrigated.  Fracture reduction was achieved to essentially an anatomic degree, and held in place with fracture clamps.  The C-arm was brought in, and this confirmed excellent reduction of the fracture, and the ankle mortise overall.    Permanent fixation was now obtained using a locking plate and screws.  Progress was monitored carefully and frequently throughout the case with the use of the C-arm.    Now the syndesmosis injury was addressed.  It was clear from the patient's injury films that her syndesmosis had been completely disrupted.  Under C-arm guidance, and with the ankle in neutral position, the syndesmosis was carefully reduced by direct pressure, compressing the fibula toward the tibia.  The drill was used to create a hole through the distal fibula, into the distal tibia, at about the level of the physeal scar, parallel to the ankle joint, and at about a 15° angle anterior to the coronal plane.  The depth gauge was used to determine the appropriate screw length, and then the syndesmosis screw was placed carefully and securely while the ankle was held in neutral position.    Final images were checked in AP, mortise, and lateral projections.  Essentially anatomic reduction was noted.      The wound was copiously irrigated with antibiotic-containing solution, and then closed in layers using standard techniques.    The skin was cleaned with wet and dry sponges, and sterile  dressings were applied.  The patient was placed in a very well-padded, carefully molded, short-leg, nonweightbearing fiberglass cast.  The vascularity of the toes was checked after the application of the cast and found to be excellent.  The patient was awakened from anesthesia without incident.    A detailed postoperative discussion and question and answer session was undertaken with the patient's .    Mohinder Key MD     Date: 4/6/2018  Time: 4:14 PM

## 2018-04-06 NOTE — ANESTHESIA PROCEDURE NOTES
Peripheral Block    Patient location during procedure: pre-op  Start time: 4/6/2018 1:01 PM  Stop time: 4/6/2018 1:05 PM  Reason for block: at surgeon's request and post-op pain management  Performed by  Anesthesiologist: MARILIN WALL  Preanesthetic Checklist  Completed: patient identified, site marked, surgical consent, pre-op evaluation, timeout performed, IV checked, risks and benefits discussed and monitors and equipment checked  Prep:  Sterile barriers:cap, gloves and mask  Prep: ChloraPrep  Patient monitoring: blood pressure monitoring, continuous pulse oximetry and EKG  Procedure  Sedation:yes    Guidance:ultrasound guided  ULTRASOUND INTERPRETATION. Using ultrasound guidance a 21 G gauge needle was placed in close proximity to the nerve, at which point, under ultrasound guidance anesthetic was injected in the area of the nerve and spread of the anesthesia was seen on ultrasound in close proximity thereto.  There were no abnormalities seen on ultrasound; a digital image was taken; and the patient tolerated the procedure with no complications. Images:still images obtained    Laterality:left  Block Type:popliteal (Popliteal/Sciatic Nerve)  Injection Technique:single-shot  Needle Type:short-bevel  Needle Gauge:21 G    Medications  Local Injected:ropivacaine 0.5% without epinephrine Local Amount Injected:30mL  Post Assessment  Injection Assessment: negative aspiration for heme, no paresthesia on injection and incremental injection  Patient Tolerance:comfortable throughout block  Complications:no

## 2018-04-06 NOTE — ANESTHESIA POSTPROCEDURE EVALUATION
"Patient: Veena Beyer    Procedure Summary     Date:  04/06/18 Room / Location:   [facility]    Anesthesia Start:  1403 Anesthesia Stop:  1616    Procedure:  ANKLE OPEN REDUCTION INTERNAL FIXATION (Left Ankle) Diagnosis:       Closed displaced fracture of lateral malleolus of left fibula, initial encounter      Ankle syndesmosis disruption, left, initial encounter      (Closed displaced fracture of lateral malleolus of left fibula, initial encounter [S82.62XA])      (Ankle syndesmosis disruption, left, initial encounter [S93.432A])    Surgeon:  Mohinder Key MD Provider:  Goyo Finch MD    Anesthesia Type:  general, regional ASA Status:  2          Anesthesia Type: general, regional  Last vitals  BP   139/90 (04/06/18 1315)   Temp   37.2 °C (98.9 °F) (04/06/18 1150)   Pulse   73 (04/06/18 1315)   Resp   16 (04/06/18 1315)     SpO2   99 % (04/06/18 1315)     Post Anesthesia Care and Evaluation    Patient location during evaluation: bedside  Patient participation: complete - patient participated  Level of consciousness: awake  Pain management: adequate  Airway patency: patent  Anesthetic complications: No anesthetic complications    Cardiovascular status: acceptable  Respiratory status: acceptable  Hydration status: acceptable    Comments: /90 (BP Location: Right arm, Patient Position: Lying)   Pulse 73   Temp 37.2 °C (98.9 °F) (Oral)   Resp 16   Ht 154.9 cm (61\")   Wt 96.6 kg (213 lb)   LMP 03/31/2018 (Exact Date)   SpO2 99%   BMI 40.25 kg/m²         "

## 2018-04-06 NOTE — H&P (VIEW-ONLY)
"Patient:  Veena Beyer is a 37 y.o. female    Chief Complaint/ Reason for Visit:    Chief Complaint   Patient presents with   • Left Ankle - Establish Care, Pain, Joint Swelling, Injury   • Right Ankle - Establish Care, Pain, Joint Swelling, Injury       HPI:  This patient presents today, accompanied by her , complaining of bilateral ankle pain.  She says that she was going down the steps to her basement and she missed the last step falling and injuring both of her ankles.  She felt and heard a loud pop in her left ankle, and had the immediate onset of severe pain and swelling in her left ankle.  She also had moderate pain in her right ankle, but can actually bear weight on it, unlike the left ankle.  She was seen in the emergency Department over at [facility] on March 31 and diagnosed with fractures of both ankles.  The right ankle seemed relatively minor and was treated in a fracture splint.  The left ankle was a much more serious injury and she was placed in a well-padded posterior splint.  She still has moderate pain in the left ankle and mild pain in the right ankle.  She has pain with any weightbearing in the right ankle though it's relatively mild.  She has throbbing pain in her left ankle which improves with elevation and rest.  She has been taking pain medicine that she was prescribed and that seems to help the discomfort somewhat.  She does not have a history of any prior serious injury to either of her ankles that she can recall at this time.    The patient denies any calf pain, chest pain, or acute shortness of breath.  She is not having any numbness, tingling, or sensory derangements in her right lower extremity, but does complain of some mild \"numbness\" in the toes of her left foot that she attributes to swelling.    She denies any other musculoskeletal injuries or complaints acutely at this time.  She does not smoke, nor does she take any exogenous hormone's, nor does she have any " "implantable birth control or hormone eluting devices..  She has no history of DVT.    The patient and her  have a 1-year-old child.  The patient says she is not currently pregnant.      PMH:    Past Medical History:   Diagnosis Date   • Ankle fracture, left        PSH:    Past Surgical History:   Procedure Laterality Date   • WISDOM TOOTH EXTRACTION  1997       Social Hx:    Social History     Social History   • Marital status:      Spouse name: N/A   • Number of children: N/A   • Years of education: N/A     Occupational History   • Not on file.     Social History Main Topics   • Smoking status: Never Smoker   • Smokeless tobacco: Not on file   • Alcohol use Yes      Comment: OCC   • Drug use: No   • Sexual activity: Defer     Other Topics Concern   • Not on file     Social History Narrative   • No narrative on file       Family Hx:  History reviewed. No pertinent family history.    Meds:    Current Outpatient Prescriptions:   •  HYDROcodone-acetaminophen (NORCO) 5-325 MG per tablet, May take 1 or 2 by mouth every 6 hours as needed for pain postoperatively., Disp: 60 tablet, Rfl: 0  •  Prenatal Vit-Fe Fumarate-FA (PRENATAL PO), Take  by mouth., Disp: , Rfl:     Allergies:    Allergies   Allergen Reactions   • Penicillins Hives       ROS:  Review of Systems   Constitutional: Positive for activity change.   Respiratory: Negative for shortness of breath.    Musculoskeletal: Positive for arthralgias, gait problem and joint swelling.   Neurological: Positive for numbness.   All other systems reviewed and are negative.      Vitals:    04/03/18 1041   BP: 128/80   BP Location: Left arm   Patient Position: Sitting   Cuff Size: Large Adult   Pulse: 96   Temp: 97.8 °F (36.6 °C)   TempSrc: Temporal Artery    Weight: 96.6 kg (213 lb)   Height: 154.9 cm (61\")   PainSc:   5   PainLoc: Ankle     Body mass index is 40.25 kg/m².    Physical Exam   Constitutional: She is oriented to person, place, and time. She appears " well-developed and well-nourished.   HENT:   Head: Normocephalic and atraumatic.   Mouth/Throat: Oropharynx is clear and moist.   Eyes: Conjunctivae and EOM are normal. Pupils are equal, round, and reactive to light. No scleral icterus.   Neck: Phonation normal. No JVD present. Carotid bruit is not present. No tracheal deviation present.   Cardiovascular: Normal rate, regular rhythm, normal heart sounds and intact distal pulses.    Pulses:       Dorsalis pedis pulses are 2+ on the right side, and 2+ on the left side.        Posterior tibial pulses are 2+ on the right side, and 2+ on the left side.   Pulmonary/Chest: Effort normal and breath sounds normal. No stridor. No respiratory distress.   Musculoskeletal:        Right ankle: She exhibits swelling. She exhibits normal range of motion, no ecchymosis, no deformity, no laceration and normal pulse. Tenderness. Lateral malleolus tenderness found. No medial malleolus, no head of 5th metatarsal and no proximal fibula tenderness found.        Left ankle: She exhibits decreased range of motion, swelling and ecchymosis. She exhibits no laceration and normal pulse. Tenderness. Lateral malleolus and medial malleolus tenderness found. No head of 5th metatarsal and no proximal fibula tenderness found.        Left lower leg: She exhibits no tenderness.   The patient has mild to moderate diffuse swelling of the left ankle.  Active ranges of motion are intact, however.  On eversion, she does have some early skin wrinkling, but I would like to see her swelling cody a bit more before proceeding with surgical treatment.    Her left calf is soft and nontender with no venous cord.   Neurological: She is alert and oriented to person, place, and time.   Skin: Skin is warm and dry. No rash noted. No cyanosis or erythema. Nails show no clubbing.   Psychiatric: She has a normal mood and affect. Her speech is normal and behavior is normal. Judgment and thought content normal.   Nursing  note and vitals reviewed.              Radiology:  X-rays: 3 views of the left ankle and 3 views of the right ankle dated March 31 on the [employer] system were reviewed.  Comparison images were not available.  The 3 right ankle images reveal what is likely a small avulsion fracture of the lateral malleolus that is acute, and a small, smooth round ossicle beneath the medial malleolus that is perhaps due to an old injury or a small accessory ossicle of some sort.  Mild soft tissue swelling is noted.    The 3 views of the left ankle reveal the patient has a spiral fracture of the distal fibula and has significant widening of the ankle mortise indicative of a syndesmotic injury in addition to the distal fibular fracture.  Moderate soft tissue swelling is noted.          Assessment:       Diagnosis Plan   1. Closed displaced fracture of lateral malleolus of left fibula, initial encounter  Case Request    clindamycin (CLEOCIN) 900 mg in dextrose 5% 50 mL IVPB (premix)    Case Request   2. Ankle syndesmosis disruption, left, initial encounter  Case Request    clindamycin (CLEOCIN) 900 mg in dextrose 5% 50 mL IVPB (premix)    Case Request   3. Closed avulsion fracture of lateral malleolus of right fibula, initial encounter             Plan:       I discussed everything with the patient and her .  We discussed that the right ankle injury seemed to be fairly minor and stable.  The patient was comfortable in a fracture splint on that side, and I think she can safely continue to bear weight on the right lower extremity as comfort dictates.  I think the right ankle will likely heal uneventfully without any surgical intervention.  It is certainly a relatively minor injury compared to her left ankle.    We reviewed the treatment options for the left ankle specifically, both surgical and nonsurgical, the potential outcomes good and bad of these options, and the pluses and minuses, risks and benefits thereof.  I reviewed that  surgery had risks, and that surgery had no guarantees.  I advised the patient that nonsurgical management of this fracture may not result in a good outcome.  I explained the elements of the fracture that made me concerned that nonsurgical management was probably not the best option, though again, I emphasized that there was no guarantee of a good outcome with surgical treatment.  We reviewed that the risks of surgery include but are not limited to bleeding, infection, injury to nerves, blood vessels, tendons, ligaments, or other soft tissue structures, skin wound problems or skin breakdown, permanent pain, permanent stiffness, permanent limping, and/or permanent swelling, the need for future surgical procedures, blood clots, arthritis, pulmonary embolism, pneumonia, stroke, heart attack, amputation, and even death due to these or other complications.  We reviewed that metallic implants would be necessary, and we reviewed that the fracture could fail to heal, or that the implants themselves could fail, or that the fixation could fail, any of which could necessitate additional surgical procedures.    The patient and her  voiced understanding.  All their questions were answered to their satisfaction.  I even told them that they could certainly obtain a second opinion and even offered to facilitate a second opinion for them.  They have declined.    The patient and her  indicated that she has decided to proceed with surgical treatment of her left ankle injury.    Narcotic counseling was performed in the usual fashion, and I emphasized the risks of narcotic use and the dangers. We discussed the potential for dependency and addiction, and we discussed things to avoid when taking these medications including specific activities to avoid, as well as avoidance of other sedating substances including alcohol or other medications.     A Leander report was checked and personally reviewed by me, and the patient  confirmed that they had reviewed the house bill 1 warnings provided. Appropriate narcotic pain medication was issued.    Postoperative recommendations, restrictions, and precautions were reviewed.  All of their questions regarding recovery, activity limitations, etc. were answered.  I emphasized the importance of elevation both preoperatively, as well as during the first week postoperatively, and we discussed the reasons for this.  We also discussed that she would need to take an 81 mg coated aspirin twice daily after surgery, once after breakfast and once after the evening meal.  We discussed that this was to reduce the likelihood of blood clots.  They voiced understanding.            Orders Placed This Encounter   Procedures   • Follow anesthesia standing orders.   • Provide instructions to patient regarding NPO status   • Obtain informed consent     Order Specific Question:   Informed Consent Given For     Answer:   Surgical reduction and internal fixation of the left ankle fracture, with reduction and fixation of the syndesmosis injury as well     Order Specific Question:   Laterality     Answer:   Left

## 2018-04-06 NOTE — ANESTHESIA PREPROCEDURE EVALUATION
Anesthesia Evaluation     no history of anesthetic complications:               Airway   Mallampati: II  no difficulty expected  Dental - normal exam     Pulmonary - negative pulmonary ROS and normal exam   (-) COPD, asthma, sleep apnea, not a smoker    PE comment: nonlabored  Cardiovascular - negative cardio ROS and normal exam    Rhythm: regular  Rate: normal    (-) hypertension, valvular problems/murmurs, past MI, CAD, dysrhythmias, angina      Neuro/Psych- negative ROS  (-) seizures, TIA, CVA  GI/Hepatic/Renal/Endo    (+) morbid obesity,    (-) GERD, liver disease, diabetes, hypothyroidism, hyperthyroidism    Musculoskeletal (-) negative ROS        ROS comment: L ankle fracture  Abdominal    Substance History      OB/GYN          Other                        Anesthesia Plan    ASA 2     general and regional   (Pop/Saph blocks for post-op pain control PSR)  intravenous induction   Anesthetic plan and risks discussed with patient.

## 2018-04-06 NOTE — BRIEF OP NOTE
ANKLE OPEN REDUCTION INTERNAL FIXATION  Progress Note    Veena Beyer  4/6/2018    Pre-op Diagnosis:   Closed displaced fracture of lateral malleolus of left fibula, initial encounter [S82.62XA]  Ankle syndesmosis disruption, left, initial encounter [S93.432A]       Post-Op Diagnosis Codes:     * Closed displaced fracture of lateral malleolus of left fibula, initial encounter [S82.62XA]     * Ankle syndesmosis disruption, left, initial encounter [S93.432A]    Procedure/CPT® Codes:      Procedure(s):  1)  open reduction internal fixation of the displaced fracture of the lateral malleolus, left ankle;  2)  surgical reduction and internal fixation of the left ankle syndesmosis disruption, both procedures being accomplished with Synthes locking distal fibular plate and screws          Surgeon(s):  Mohinder Key MD    Anesthesia: General with Block    Staff:   Circulator: Swetha Joyce RN; Pau Carter RN  Radiology Technologist: Adeline Gann, LEROY  Scrub Person: Ray Donis  Vendor Representative: Sanjeev Ocampo    Estimated Blood Loss: 20 mL    Urine Voided: * No values recorded between 4/6/2018  2:03 PM and 4/6/2018  4:03 PM *    Specimens:                None      Drains:  none      Findings: Bone quality was satisfactory.    Complications: None noted intraoperatively      Mohinder Key MD     Date: 4/6/2018  Time: 4:14 PM

## 2018-04-06 NOTE — ANESTHESIA PROCEDURE NOTES
Peripheral Block    Patient location during procedure: pre-op  Start time: 4/6/2018 1:06 PM  Stop time: 4/6/2018 1:10 PM  Reason for block: at surgeon's request and post-op pain management  Performed by  Anesthesiologist: MARILIN WALL  Preanesthetic Checklist  Completed: patient identified, site marked, surgical consent, pre-op evaluation, timeout performed, IV checked, risks and benefits discussed and monitors and equipment checked  Prep:  Sterile barriers:cap, gloves and mask  Prep: ChloraPrep  Patient monitoring: blood pressure monitoring, continuous pulse oximetry and EKG  Procedure  Sedation:yes    Guidance:ultrasound guided  ULTRASOUND INTERPRETATION. Using ultrasound guidance a 21 G gauge needle was placed in close proximity to the nerve, at which point, under ultrasound guidance anesthetic was injected in the area of the nerve and spread of the anesthesia was seen on ultrasound in close proximity thereto.  There were no abnormalities seen on ultrasound; a digital image was taken; and the patient tolerated the procedure with no complications. Images:still images obtained    Laterality:left  Block Type:saphenous  Injection Technique:single-shotNeedle Gauge:21 G    Medications  Local Injected:ropivacaine 0.5% without epinephrine Local Amount Injected:20mL  Post Assessment  Injection Assessment: negative aspiration for heme, no paresthesia on injection and incremental injection  Patient Tolerance:comfortable throughout block  Complications:no

## 2018-04-09 ENCOUNTER — TELEPHONE (OUTPATIENT)
Dept: ORTHOPEDIC SURGERY | Facility: CLINIC | Age: 38
End: 2018-04-09

## 2018-04-09 NOTE — TELEPHONE ENCOUNTER
1) Says needs post op appt in 2 weeks. Since MELANY will be out of office at that time, please advise when to see.    2) Asking for script for walker.    3) Does she need to continue taking chewable baby ASA?    4) Asked if screws MELANY put in will have to come out in the future?

## 2018-04-09 NOTE — TELEPHONE ENCOUNTER
1) please ensure that she has an appointment to see Dr. Covarrubias on Friday, April 20.  I have discussed this with him.  I can then see her about a month thereafter as long as she is doing okay when he sees her.    2) please write a prescription for this or ask Sharad what to do and we can take care of it.  A knee scooter may also be a good option for her, as discussed.  Thank you    3) yes; she needs to take a baby aspirin twice a day, once after breakfast and once after the evening meal, until further notice.    4) Only 1 screw will need to be removed.  That will occur around 14 weeks postop.        Interestingly, I addressed each and every one of these points with the patient and her  when they were in the office.  The only thing, perhaps, that we did not discuss specifically was a walker.  Otherwise, I'm a bit puzzled as to why they don't recall the discussion of all of the other items.    Regardless, thank you for your help.

## 2018-04-09 NOTE — TELEPHONE ENCOUNTER
1)  Appointment was set up with LYNETTE  On 4/20 /2018    2)  Script written.  ARGELIA signed since MELANY was gone for the day.    3 & 4) Info relayed.    5)  Patient reported still having numbness from knee to heel.  She reports that numbness is slowly fading.  ARGELIA advised that as long as it is improving, it is okay.  If it gets worse, she can call office tomorrow.

## 2018-04-17 ENCOUNTER — TELEPHONE (OUTPATIENT)
Dept: ORTHOPEDIC SURGERY | Facility: CLINIC | Age: 38
End: 2018-04-17

## 2018-04-17 ENCOUNTER — OFFICE VISIT (OUTPATIENT)
Dept: ORTHOPEDIC SURGERY | Facility: CLINIC | Age: 38
End: 2018-04-17

## 2018-04-17 ENCOUNTER — HOSPITAL ENCOUNTER (OUTPATIENT)
Dept: CARDIOLOGY | Facility: HOSPITAL | Age: 38
Discharge: HOME | End: 2018-04-17
Attending: ORTHOPAEDIC SURGERY | Admitting: ORTHOPAEDIC SURGERY

## 2018-04-17 VITALS — WEIGHT: 213 LBS | HEIGHT: 61 IN | BODY MASS INDEX: 40.22 KG/M2 | TEMPERATURE: 98.2 F

## 2018-04-17 DIAGNOSIS — Z87.81 STATUS POST OPEN REDUCTION AND INTERNAL FIXATION (ORIF) OF SYNDESMOSIS: ICD-10-CM

## 2018-04-17 DIAGNOSIS — M79.662 PAIN OF LEFT CALF: Primary | ICD-10-CM

## 2018-04-17 DIAGNOSIS — S82.62XD CLOSED DISPLACED FRACTURE OF LATERAL MALLEOLUS OF LEFT FIBULA WITH ROUTINE HEALING, SUBSEQUENT ENCOUNTER: ICD-10-CM

## 2018-04-17 DIAGNOSIS — M79.662 PAIN AND SWELLING OF LEFT LOWER LEG: ICD-10-CM

## 2018-04-17 DIAGNOSIS — Z98.890 STATUS POST OPEN REDUCTION WITH INTERNAL FIXATION (ORIF) OF FRACTURE OF ANKLE: ICD-10-CM

## 2018-04-17 DIAGNOSIS — Z87.81 STATUS POST OPEN REDUCTION WITH INTERNAL FIXATION (ORIF) OF FRACTURE OF ANKLE: ICD-10-CM

## 2018-04-17 DIAGNOSIS — M79.89 PAIN AND SWELLING OF LEFT LOWER LEG: ICD-10-CM

## 2018-04-17 DIAGNOSIS — S93.432D ANKLE SYNDESMOSIS DISRUPTION, LEFT, SUBSEQUENT ENCOUNTER: ICD-10-CM

## 2018-04-17 DIAGNOSIS — Z98.890 STATUS POST OPEN REDUCTION AND INTERNAL FIXATION (ORIF) OF SYNDESMOSIS: ICD-10-CM

## 2018-04-17 DIAGNOSIS — M79.662 PAIN OF LEFT CALF: ICD-10-CM

## 2018-04-17 LAB
BH CV LOWER VASCULAR LEFT COMMON FEMORAL AUGMENT: NORMAL
BH CV LOWER VASCULAR LEFT COMMON FEMORAL COMPETENT: NORMAL
BH CV LOWER VASCULAR LEFT COMMON FEMORAL COMPRESS: NORMAL
BH CV LOWER VASCULAR LEFT COMMON FEMORAL PHASIC: NORMAL
BH CV LOWER VASCULAR LEFT COMMON FEMORAL SPONT: NORMAL
BH CV LOWER VASCULAR LEFT DISTAL FEMORAL COMPRESS: NORMAL
BH CV LOWER VASCULAR LEFT GASTRONEMIUS COMPRESS: NORMAL
BH CV LOWER VASCULAR LEFT GREATER SAPH AK COMPRESS: NORMAL
BH CV LOWER VASCULAR LEFT GREATER SAPH BK COMPRESS: NORMAL
BH CV LOWER VASCULAR LEFT LESSER SAPH COMPRESS: NORMAL
BH CV LOWER VASCULAR LEFT MID FEMORAL AUGMENT: NORMAL
BH CV LOWER VASCULAR LEFT MID FEMORAL COMPETENT: NORMAL
BH CV LOWER VASCULAR LEFT MID FEMORAL COMPRESS: NORMAL
BH CV LOWER VASCULAR LEFT MID FEMORAL PHASIC: NORMAL
BH CV LOWER VASCULAR LEFT MID FEMORAL SPONT: NORMAL
BH CV LOWER VASCULAR LEFT PERONEAL COMPRESS: NORMAL
BH CV LOWER VASCULAR LEFT POPLITEAL AUGMENT: NORMAL
BH CV LOWER VASCULAR LEFT POPLITEAL COMPETENT: NORMAL
BH CV LOWER VASCULAR LEFT POPLITEAL COMPRESS: NORMAL
BH CV LOWER VASCULAR LEFT POPLITEAL PHASIC: NORMAL
BH CV LOWER VASCULAR LEFT POPLITEAL SPONT: NORMAL
BH CV LOWER VASCULAR LEFT POSTERIOR TIBIAL COMPRESS: NORMAL
BH CV LOWER VASCULAR LEFT PROXIMAL FEMORAL COMPRESS: NORMAL
BH CV LOWER VASCULAR LEFT SAPHENOFEMORAL JUNCTION AUGMENT: NORMAL
BH CV LOWER VASCULAR LEFT SAPHENOFEMORAL JUNCTION COMPETENT: NORMAL
BH CV LOWER VASCULAR LEFT SAPHENOFEMORAL JUNCTION COMPRESS: NORMAL
BH CV LOWER VASCULAR LEFT SAPHENOFEMORAL JUNCTION PHASIC: NORMAL
BH CV LOWER VASCULAR LEFT SAPHENOFEMORAL JUNCTION SPONT: NORMAL
BH CV LOWER VASCULAR RIGHT COMMON FEMORAL AUGMENT: NORMAL
BH CV LOWER VASCULAR RIGHT COMMON FEMORAL COMPETENT: NORMAL
BH CV LOWER VASCULAR RIGHT COMMON FEMORAL COMPRESS: NORMAL
BH CV LOWER VASCULAR RIGHT COMMON FEMORAL PHASIC: NORMAL
BH CV LOWER VASCULAR RIGHT COMMON FEMORAL SPONT: NORMAL

## 2018-04-17 PROCEDURE — 73610 X-RAY EXAM OF ANKLE: CPT | Performed by: ORTHOPAEDIC SURGERY

## 2018-04-17 PROCEDURE — 93971 EXTREMITY STUDY: CPT

## 2018-04-17 PROCEDURE — 99213 OFFICE O/P EST LOW 20 MIN: CPT | Performed by: ORTHOPAEDIC SURGERY

## 2018-04-17 NOTE — PROGRESS NOTES
Patient:  Veena Beyer is a 37 y.o. female    Chief Complaint/ Reason for Visit:    Chief Complaint   Patient presents with   • Left Ankle - Follow-up, Pain, Post-op, Leg Pain       HPI:  The patient called earlier today and was concerned about a cramping feeling she was having in her left calf.  She was not having any shortness of breath, nor is she having any palpitations.  She has not had any fever, chills, sweats, or shakes.  She is now 11 days status post open reduction internal fixation of a fracture dislocation of the left ankle, including locking plate fixation of the distal fibula as well as screw fixation of her syndesmosis.    She otherwise has been doing well.  She says she has been very comfortable in her cast, and appreciates the fact that it was so well padded.  She does not have any numbness, tingling, or other sensory derangements in her foot or toes.  She says that the block she had preoperatively lasted nearly 36 hours postoperatively, and she is grateful for that.      PMH:    Past Medical History:   Diagnosis Date   • Ankle fracture, left        PSH:    Past Surgical History:   Procedure Laterality Date   • ANKLE OPEN REDUCTION INTERNAL FIXATION Left 4/6/2018    Procedure: ANKLE OPEN REDUCTION INTERNAL FIXATION;  Surgeon: Mohinder Key MD;  Location: [facility] [place];  Service: Orthopedics   • WISDOM TOOTH EXTRACTION  1997       Social Hx:    Social History     Social History   • Marital status:      Spouse name: N/A   • Number of children: N/A   • Years of education: N/A     Occupational History   • Not on file.     Social History Main Topics   • Smoking status: Never Smoker   • Smokeless tobacco: Never Used   • Alcohol use Yes      Comment: OCC   • Drug use: No   • Sexual activity: Defer     Other Topics Concern   • Not on file     Social History Narrative   • No narrative on file       Family Hx:  History reviewed. No pertinent family history.    Meds:    Current Outpatient  "Prescriptions:   •  Prenatal Vit-Fe Fumarate-FA (PRENATAL PO), Take  by mouth., Disp: , Rfl:   •  HYDROcodone-acetaminophen (NORCO) 5-325 MG per tablet, May take 1 or 2 by mouth every 6 hours as needed for pain postoperatively., Disp: 60 tablet, Rfl: 0    Allergies:    Allergies   Allergen Reactions   • Penicillins Hives       ROS:  Review of Systems-- as per the history of present illness, otherwise reviewed and negative for any other complaints throughout all other systems at this time.    Vitals:    04/17/18 1132   Temp: 98.2 °F (36.8 °C)   TempSrc: Temporal Artery    Weight: 96.6 kg (213 lb)   Height: 154.9 cm (61\")     Body mass index is 40.25 kg/m².    Physical Exam    The patient is awake, alert, and oriented ×3.  The patient is in no acute distress.  Breathing is regular and unlabored with a respiratory rate of 12/m.  Extraocular movements and pupillary responses are symmetrically intact. Sclerae are anicteric.   Hearing is within normal limits.  Speech is within normal limits.  There is no jugular venous distention.    Left lower extremity: Her left ankle and lower leg looks great.  There is some mild swelling, but certainly nothing out of the ordinary given her recent surgical treatment.  Her incision laterally is beautifully healed.  There is absolutely no sign of infection.  Active dorsiflexion, plantarflexion, inversion, and eversion are intact.  Her left calf is soft but there is some tenderness in the posterior aspect of the gastrocnemius.  I do not feel a venous cord.  There is no cellulitis, no lymphangitis, and no popliteal lymphadenopathy.  Sensory exam intact light touch and capillary filling is brisk in all toes.  Dorsalis pedis and posterior tibial pulses are strong and regular with a current heart rate of about 78 bpm.        Radiology: X-rays: 3 views of the patient's left ankle were ordered and reviewed today to assess postoperative status and alignment.  I did review these and I went over " them with the patient and her .  These show excellent alignment in all respects.  The ankle mortise appears anatomically reduced.  I see no problems on these images.  In comparison to the intraoperative images which I reviewed on our [employer] system, the only interval change has been a diminution of swelling.  The position of the ankle and the hardware is unchanged.          Assessment:     Diagnosis Plan   1. Pain of left calf  Duplex Venous Lower Extremity - Left CAR   2. Pain and swelling of left lower leg  Duplex Venous Lower Extremity - Left CAR   3. Status post open reduction with internal fixation (ORIF) of fracture of ankle  Duplex Venous Lower Extremity - Left CAR   4. Status post open reduction and internal fixation (ORIF) of syndesmosis  Duplex Venous Lower Extremity - Left CAR   5. Closed displaced fracture of lateral malleolus of left fibula with routine healing, subsequent encounter  Duplex Venous Lower Extremity - Left CAR   6. Ankle syndesmosis disruption, left, subsequent encounter  Duplex Venous Lower Extremity - Left CAR           Plan:  I discussed everything with the patient and her .  She has been taking the aspirin twice daily as recommended.  She is obviously been elevating the leg as instructed as she hardly has any swelling.  However, giving the complaints of calf pain and a cramping feeling, as well as the fact that she is somewhat overweight, I think we are obligated to check a Doppler ultrasound to rule out a DVT.  The patient and her  agree.    She has already been fitted with her fracture boot and was comfortable being in that.    We discussed the importance of daily range of motion program which she is going to begin at this point.  We discussed skin care and she understands that her incision is perfectly healed that she may shower or bathe without any worries about the incision.    I emphasized strongly and repeatedly that she must remain absolutely  nonweightbearing until further notice.    I also reminded her that she should look at the boot as a cast and should wear it at all times except during her range of motion sessions throughout the day.  She understands that she should sleep in it as well.    Activity recommendations, restrictions, and precautions were reviewed and reinforced in great detail and the patient and her  voiced understanding.    We're going to send her straight over to the hospital for Doppler ultrasound.  If it is negative, then I'm going to see the patient back in the office for repeat examination and x-rays in about 5 weeks.  In the meantime, she is going to continue her twice daily aspirin as she has been.  Obviously, if the Dopplers positive, appropriate treatment will be initiated.

## 2018-04-17 NOTE — PROGRESS NOTES
Vascular lab left lower extremity venous doppler complete, prelim negative DVT left leg - AYANNA Dan aware

## 2018-04-17 NOTE — TELEPHONE ENCOUNTER
MELANY would like this patient to come in today.  Cast will be removed and she will be sent for doppler.

## 2018-05-23 ENCOUNTER — OFFICE VISIT (OUTPATIENT)
Dept: ORTHOPEDIC SURGERY | Facility: CLINIC | Age: 38
End: 2018-05-23

## 2018-05-23 VITALS — WEIGHT: 220.8 LBS | HEIGHT: 61 IN | BODY MASS INDEX: 41.69 KG/M2 | TEMPERATURE: 97.7 F

## 2018-05-23 DIAGNOSIS — Z98.890 STATUS POST OPEN REDUCTION WITH INTERNAL FIXATION (ORIF) OF FRACTURE OF ANKLE: Primary | ICD-10-CM

## 2018-05-23 DIAGNOSIS — Z87.81 STATUS POST OPEN REDUCTION WITH INTERNAL FIXATION (ORIF) OF FRACTURE OF ANKLE: Primary | ICD-10-CM

## 2018-05-23 PROBLEM — M79.89 PAIN AND SWELLING OF LEFT LOWER LEG: Status: RESOLVED | Noted: 2018-04-17 | Resolved: 2018-05-23

## 2018-05-23 PROBLEM — M79.662 PAIN AND SWELLING OF LEFT LOWER LEG: Status: RESOLVED | Noted: 2018-04-17 | Resolved: 2018-05-23

## 2018-05-23 PROBLEM — M79.662 PAIN OF LEFT CALF: Status: RESOLVED | Noted: 2018-04-17 | Resolved: 2018-05-23

## 2018-05-23 PROCEDURE — 73610 X-RAY EXAM OF ANKLE: CPT | Performed by: ORTHOPAEDIC SURGERY

## 2018-05-23 PROCEDURE — 99024 POSTOP FOLLOW-UP VISIT: CPT | Performed by: ORTHOPAEDIC SURGERY

## 2018-05-23 NOTE — PROGRESS NOTES
Patient:  Veena Beyer is a 37 y.o. female    Chief Complaint/ Reason for Visit:    Chief Complaint   Patient presents with   • Left Ankle - Follow-up       HPI:  The patient presents today, accompanied by her , for scheduled follow-up on her left ankle fracture dislocation which I treated surgically now about 6-1/2 weeks ago.  She says she has no pain anymore.  The swelling in her left leg and ankle have almost entirely resolved.  She has had no trouble with her incision.  She has had no calf pain, no chest pain, and no shortness of breath.  She feels like she is doing very well.  She has been coming out of the boot as instructed work on range of motion and has been working on range of motion of her ankle, foot, and toes.      PMH:    Past Medical History:   Diagnosis Date   • Ankle fracture, left        PSH:    Past Surgical History:   Procedure Laterality Date   • ANKLE OPEN REDUCTION INTERNAL FIXATION Left 4/6/2018    Procedure: ANKLE OPEN REDUCTION INTERNAL FIXATION;  Surgeon: Mohinder Key MD;  [place] [facility] [place] [facility] [place]  Service: Orthopedics   • WISDOM TOOTH EXTRACTION  1997       Social Hx:    Social History     Social History   • Marital status:      Spouse name: N/A   • Number of children: N/A   • Years of education: N/A     Occupational History   • Not on file.     Social History Main Topics   • Smoking status: Never Smoker   • Smokeless tobacco: Never Used   • Alcohol use Yes      Comment: OCC   • Drug use: No   • Sexual activity: Defer     Other Topics Concern   • Not on file     Social History Narrative   • No narrative on file       Family Hx:  History reviewed. No pertinent family history.    Meds:    Current Outpatient Prescriptions:   •  Prenatal Vit-Fe Fumarate-FA (PRENATAL PO), Take  by mouth., Disp: , Rfl:   •  HYDROcodone-acetaminophen (NORCO) 5-325 MG per tablet, May take 1 or 2 by mouth every 6 hours as needed for pain postoperatively., Disp: 60 tablet, Rfl:  "0    Allergies:    Allergies   Allergen Reactions   • Penicillins Hives       ROS:  Review of Systems    Vitals:    05/23/18 1508   Temp: 97.7 °F (36.5 °C)   TempSrc: Temporal Artery    Weight: 100 kg (220 lb 12.8 oz)   Height: 154.9 cm (61\")     Body mass index is 41.72 kg/m².    Physical Exam    The patient is awake, alert, and oriented ×3.  The patient is in no acute distress.  Breathing is regular and unlabored with a respiratory rate of 12/m.  Extraocular movements and pupillary responses are symmetrically intact. Sclerae are anicteric.   Hearing is within normal limits.  Speech is within normal limits.  There is no jugular venous distention.    Left lower extremity: Her left ankle and foot look great.  There is really no significant swelling.  Her lateral incision is healed beautifully with absolutely no sign of infection.  Her dorsiflexion, plantarflexion, inversion, and eversion are coming along.  Her toe range of motion is definitely improved.  Her left calf is quite soft and nontender with no venous cord.  Homans sign is negative.        Radiology: X-rays: 3 views the patient's left ankle were ordered and reviewed today to assess fracture alignment and healing status post ORIF of the left distal fibula and ORIF of the syndesmosis of the left ankle.  Today's images reveal essentially anatomic alignment in all respects.  No copy complicating features are seen.  In comparison to the previous images, I do believe that there is evidence of advancement of healing of the distal fibular fracture.  Overall alignment remains excellent.  There is some reduction of soft tissue swelling on today's images compared to the previous images.          Assessment:     Diagnosis Plan   1. Status post open reduction with internal fixation (ORIF) of fracture of ankle  XR Ankle 3+ View Left           Plan:  I discussed everything with the patient and her .  We discussed additional exercises for working on range of motion " and strengthening of the foot and ankle muscles.  We are also going to start her at 25% partial weightbearing, and advanced this by 25% per week over the next 4 weeks.  I went over exactly how to achieve this with the patient and her  in great detail, and they voice understanding.  Activity recommendations, restrictions, and precautions were reviewed and reinforced in great detail.  All of the patient's questions, and those of her , were answered to their satisfaction.  They voiced understanding of our discussion.    I'll see her back in about a month and we may begin formal physical therapy at that point.  We will need x-rays, 3 views of the left ankle, at follow-up.        Orders Placed This Encounter   Procedures   • XR Ankle 3+ View Left     Order Specific Question:   Reason for Exam:     Answer:   f/u lt. ankle     Order Specific Question:   Patient Pregnant     Answer:   No

## 2018-06-21 ENCOUNTER — OFFICE VISIT (OUTPATIENT)
Dept: ORTHOPEDIC SURGERY | Facility: CLINIC | Age: 38
End: 2018-06-21

## 2018-06-21 VITALS — WEIGHT: 222.6 LBS | BODY MASS INDEX: 42.03 KG/M2 | TEMPERATURE: 98.2 F | HEIGHT: 61 IN

## 2018-06-21 DIAGNOSIS — Z98.890 STATUS POST OPEN REDUCTION WITH INTERNAL FIXATION (ORIF) OF FRACTURE OF ANKLE: Primary | ICD-10-CM

## 2018-06-21 DIAGNOSIS — Z87.81 STATUS POST OPEN REDUCTION AND INTERNAL FIXATION (ORIF) OF SYNDESMOSIS: ICD-10-CM

## 2018-06-21 DIAGNOSIS — Z87.81 STATUS POST OPEN REDUCTION WITH INTERNAL FIXATION (ORIF) OF FRACTURE OF ANKLE: Primary | ICD-10-CM

## 2018-06-21 DIAGNOSIS — Z96.9 RETAINED ORTHOPEDIC HARDWARE: ICD-10-CM

## 2018-06-21 DIAGNOSIS — Z98.890 STATUS POST OPEN REDUCTION AND INTERNAL FIXATION (ORIF) OF SYNDESMOSIS: ICD-10-CM

## 2018-06-21 PROCEDURE — 73610 X-RAY EXAM OF ANKLE: CPT | Performed by: ORTHOPAEDIC SURGERY

## 2018-06-21 PROCEDURE — 99024 POSTOP FOLLOW-UP VISIT: CPT | Performed by: ORTHOPAEDIC SURGERY

## 2018-06-21 NOTE — PROGRESS NOTES
Patient:  Veena Beyer is a 37 y.o. female    Chief Complaint/ Reason for Visit:    Chief Complaint   Patient presents with   • Left Ankle - Follow-up       HPI:  This pleasant lady returns today with her  for a postop check on her left ankle.  Tomorrow she will be 11 weeks status post open reduction internal fixation of a displaced fracture of the left ankle including reduction and fixation of her syndesmosis.  She says she has no pain.  She has no calf pain, no chest pain, and no shortness of breath.  She has had no trouble with her incision, and is very pleased with the way it has healed.      PMH:    Past Medical History:   Diagnosis Date   • Ankle fracture, left        PSH:    Past Surgical History:   Procedure Laterality Date   • ANKLE OPEN REDUCTION INTERNAL FIXATION Left 4/6/2018    Procedure: ANKLE OPEN REDUCTION INTERNAL FIXATION;  Surgeon: Mohinder Key MD;  Location: [facility];  Service: Orthopedics   • WISDOM TOOTH EXTRACTION  1997       Social Hx:    Social History     Social History   • Marital status:      Spouse name: N/A   • Number of children: N/A   • Years of education: N/A     Occupational History   • Not on file.     Social History Main Topics   • Smoking status: Never Smoker   • Smokeless tobacco: Never Used   • Alcohol use Yes      Comment: OCC   • Drug use: No   • Sexual activity: Defer     Other Topics Concern   • Not on file     Social History Narrative   • No narrative on file       Family Hx:  History reviewed. No pertinent family history.    Meds:    Current Outpatient Prescriptions:   •  Calcium Carbonate-Vitamin D 600-400 MG-UNIT chewable tablet, Chew 1 tablet., Disp: , Rfl:   •  Prenatal Vit-Fe Fumarate-FA (PRENATAL PO), Take  by mouth., Disp: , Rfl:   •  HYDROcodone-acetaminophen (NORCO) 5-325 MG per tablet, May take 1 or 2 by mouth every 6 hours as needed for pain postoperatively., Disp: 60 tablet, Rfl: 0    Allergies:    Allergies   Allergen Reactions   •  "Penicillins Hives       ROS:  Review of Systems    Vitals:    06/21/18 1112   Temp: 98.2 °F (36.8 °C)   TempSrc: Temporal Artery    Weight: 101 kg (222 lb 9.6 oz)   Height: 154.9 cm (61\")     Body mass index is 42.06 kg/m².    Physical Exam    The patient is awake, alert, and oriented ×3.  The patient is in no acute distress.  Breathing is regular and unlabored with a respiratory rate of 12/m.  Extraocular movements and pupillary responses are symmetrically intact. Sclerae are anicteric.   Hearing is within normal limits.  Speech is within normal limits.  There is no jugular venous distention.    Left ankle: Left ankle looks great.  There is really no significant swelling.  In comparison to the right ankle, there is hardly any difference.  There is just very slight swelling.  The incisions beautifully healed with absolutely no sign of infection.  The patient has a full active functional range of motion.  Her left calf is soft and nontender.  Neurovascular exams intact and normal.        Radiology: X-rays: 3 views of the patient's left ankle were ordered and reviewed today to assess postoperative status and alignment and healing.  I did review these and I did compare them to previous sets of images of the left ankle.  In the office.  Today's images reveal the distal fibular fracture is completely healed.  The mortise appears anatomically aligned.  Hardware is intact and I see no complicating features on these films.          Assessment:     Diagnosis Plan   1. Status post open reduction with internal fixation (ORIF) of fracture of ankle  XR Ankle 3+ View Left    Ambulatory Referral to Physical Therapy    Case Request    clindamycin (CLEOCIN) 900 mg in dextrose (D5W) 5 % 100 mL IVPB    Case Request   2. Status post open reduction and internal fixation (ORIF) of syndesmosis     3. Retained orthopedic hardware  Case Request    clindamycin (CLEOCIN) 900 mg in dextrose (D5W) 5 % 100 mL IVPB    Case Request           Plan: "  I discussed everything with the patient and her .  I answered all their questions.  Activity recommendations, restrictions, and precautions were reviewed and reinforced and they voice understanding.    We reviewed that we need to schedule her to undergo removal of the syndesmosis screw in the next 3 weeks or so.  Were going to start physical therapy as well.    I will see her back for a postop check after we have removed the syndesmosis screw from the left ankle.  They understand that this is a brief, outpatient procedure.      Orders Placed This Encounter   Procedures   • XR Ankle 3+ View Left     Order Specific Question:   Reason for Exam:     Answer:   ankle     Order Specific Question:   Patient Pregnant     Answer:   No   • Ambulatory Referral to Physical Therapy     Referral Priority:   Routine     Referral Type:   Therapy     Referral Reason:   Specialty Services Required     Requested Specialty:   Physical Therapy     Number of Visits Requested:   1   • Follow anesthesia standing orders.   • Provide instructions to patient regarding NPO status   • Obtain informed consent     Order Specific Question:   Informed Consent Given For     Answer:   Removal of syndesmosis screw from left ankle     Order Specific Question:   Laterality     Answer:   Left

## 2018-07-16 ENCOUNTER — APPOINTMENT (OUTPATIENT)
Dept: PREADMISSION TESTING | Facility: HOSPITAL | Age: 38
End: 2018-07-16

## 2018-07-16 VITALS
SYSTOLIC BLOOD PRESSURE: 127 MMHG | RESPIRATION RATE: 20 BRPM | TEMPERATURE: 98.1 F | BODY MASS INDEX: 41.16 KG/M2 | HEART RATE: 68 BPM | OXYGEN SATURATION: 99 % | WEIGHT: 218 LBS | HEIGHT: 61 IN | DIASTOLIC BLOOD PRESSURE: 84 MMHG

## 2018-07-16 LAB
DEPRECATED RDW RBC AUTO: 41.9 FL (ref 37–54)
ERYTHROCYTE [DISTWIDTH] IN BLOOD BY AUTOMATED COUNT: 12.4 % (ref 11.7–13)
HCG SERPL QL: NEGATIVE
HCT VFR BLD AUTO: 39.5 % (ref 35.6–45.5)
HGB BLD-MCNC: 12.6 G/DL (ref 11.9–15.5)
MCH RBC QN AUTO: 29.7 PG (ref 26.9–32)
MCHC RBC AUTO-ENTMCNC: 31.9 G/DL (ref 32.4–36.3)
MCV RBC AUTO: 93.2 FL (ref 80.5–98.2)
PLATELET # BLD AUTO: 259 10*3/MM3 (ref 140–500)
PMV BLD AUTO: 10.2 FL (ref 6–12)
RBC # BLD AUTO: 4.24 10*6/MM3 (ref 3.9–5.2)
WBC NRBC COR # BLD: 5.95 10*3/MM3 (ref 4.5–10.7)

## 2018-07-16 PROCEDURE — 84703 CHORIONIC GONADOTROPIN ASSAY: CPT | Performed by: ORTHOPAEDIC SURGERY

## 2018-07-16 PROCEDURE — 36415 COLL VENOUS BLD VENIPUNCTURE: CPT

## 2018-07-16 PROCEDURE — 85027 COMPLETE CBC AUTOMATED: CPT | Performed by: ORTHOPAEDIC SURGERY

## 2018-07-16 RX ORDER — HYDROCODONE BITARTRATE AND ACETAMINOPHEN 5; 325 MG/1; MG/1
1 TABLET ORAL EVERY 6 HOURS PRN
COMMUNITY
End: 2018-10-03

## 2018-07-16 NOTE — DISCHARGE INSTRUCTIONS
Take the following medications the morning of surgery with a small sip of water:  NONE    ARRIVAL TIME 1030 TO [facility]        General Instructions:  • Do not eat solid food after midnight the night before surgery.  • You may drink clear liquids day of surgery but must stop at least one hour before your hospital arrival time (CUTOFF TIME 0930 AM)  • It is beneficial for you to have a clear drink that contains carbohydrates the day of surgery.  We suggest a 12 to 20 ounce bottle of Gatorade or Powerade for non-diabetic patients or a 12 to 20 ounce bottle of G2 or Powerade Zero for diabetic patients. (Pediatric patients, are not advised to drink a 12 to 20 ounce carbohydrate drink)    Clear liquids are liquids you can see through.  Nothing red in color.     Plain water                               Sports drinks  Sodas                                   Gelatin (Jell-O)  Fruit juices without pulp such as white grape juice and apple juice  Popsicles that contain no fruit or yogurt  Tea or coffee (no cream or milk added)  Gatorade / Powerade  G2 / Powerade Zero    • Infants may have breast milk up to four hours before surgery.  • Infants drinking formula may drink formula up to six hours before surgery.   • Patients who avoid smoking, chewing tobacco and alcohol for 4 weeks prior to surgery have a reduced risk of post-operative complications.  Quit smoking as many days before surgery as you can.  • Do not smoke, use chewing tobacco or drink alcohol the day of surgery.   • If applicable bring your C-PAP/ BI-PAP machine.  • Bring any papers given to you in the doctor’s office.  • Wear clean comfortable clothes and socks.  • Do not wear contact lenses or make-up.  Bring a case for your glasses.   • Bring crutches or walker if applicable.  • Remove all piercings.  Leave jewelry and any other valuables at home.  • Hair extensions with metal clips must be removed prior to surgery.  • The Pre-Admission Testing nurse  will instruct you to bring medications if unable to obtain an accurate list in Pre-Admission Testing.            Preventing a Surgical Site Infection:  • For 2 to 3 days before surgery, avoid shaving with a razor because the razor can irritate skin and make it easier to develop an infection.    • Any areas of open skin can increase the risk of a post-operative wound infection by allowing bacteria to enter and travel throughout the body.  Notify your surgeon if you have any skin wounds / rashes even if it is not near the expected surgical site.  The area will need assessed to determine if surgery should be delayed until it is healed.  • The night prior to surgery sleep in a clean bed with clean clothing.  Do not allow pets to sleep with you.  • Shower on the morning of surgery using a fresh bar of anti-bacterial soap (such as Dial) and clean washcloth.  Dry with a clean towel and dress in clean clothing.  • Ask your surgeon if you will be receiving antibiotics prior to surgery.  • Make sure you, your family, and all healthcare providers clean their hands with soap and water or an alcohol based hand  before caring for you or your wound.    Day of surgery:  Upon arrival, a Pre-op nurse and Anesthesiologist will review your health history, obtain vital signs, and answer questions you may have.  The only belongings needed at this time will be your home medications and if applicable your C-PAP/BI-PAP machine.  If you are staying overnight your family can leave the rest of your belongings in the car and bring them to your room later.  A Pre-op nurse will start an IV and you may receive medication in preparation for surgery, including something to help you relax.  Your family will be able to see you in the Pre-op area.  While you are in surgery your family should notify the waiting room  if they leave the waiting room area and provide a contact phone number.    Please be aware that surgery does come  with discomfort.  We want to make every effort to control your discomfort so please discuss any uncontrolled symptoms with your nurse.   Your doctor will most likely have prescribed pain medications.      If you are going home after surgery you will receive individualized written care instructions before being discharged.  A responsible adult must drive you to and from the hospital on the day of your surgery and stay with you for 24 hours.    If you are staying overnight following surgery, you will be transported to your hospital room following the recovery period.  [facility] has all private rooms.    You have received a list of surgical assistants for your reference.  If you have any questions please call Pre-Admission Testing at [phone].  Deductibles and co-payments are collected on the day of service. Please be prepared to pay the required co-pay, deductible or deposit on the day of service as defined by your plan.

## 2018-07-20 ENCOUNTER — HOSPITAL ENCOUNTER (OUTPATIENT)
Facility: HOSPITAL | Age: 38
Setting detail: HOSPITAL OUTPATIENT SURGERY
Discharge: HOME | End: 2018-07-20
Attending: ORTHOPAEDIC SURGERY | Admitting: ANESTHESIOLOGY

## 2018-07-20 ENCOUNTER — ANESTHESIA EVENT (OUTPATIENT)
Dept: PERIOP | Facility: HOSPITAL | Age: 38
End: 2018-07-20

## 2018-07-20 ENCOUNTER — APPOINTMENT (OUTPATIENT)
Dept: GENERAL RADIOLOGY | Facility: HOSPITAL | Age: 38
End: 2018-07-20

## 2018-07-20 ENCOUNTER — ANESTHESIA (OUTPATIENT)
Dept: PERIOP | Facility: HOSPITAL | Age: 38
End: 2018-07-20

## 2018-07-20 VITALS
RESPIRATION RATE: 16 BRPM | SYSTOLIC BLOOD PRESSURE: 130 MMHG | TEMPERATURE: 97.8 F | OXYGEN SATURATION: 98 % | DIASTOLIC BLOOD PRESSURE: 91 MMHG | HEART RATE: 71 BPM

## 2018-07-20 DIAGNOSIS — Z98.890 STATUS POST OPEN REDUCTION WITH INTERNAL FIXATION (ORIF) OF FRACTURE OF ANKLE: ICD-10-CM

## 2018-07-20 DIAGNOSIS — Z96.9 RETAINED ORTHOPEDIC HARDWARE: ICD-10-CM

## 2018-07-20 DIAGNOSIS — Z87.81 STATUS POST OPEN REDUCTION WITH INTERNAL FIXATION (ORIF) OF FRACTURE OF ANKLE: ICD-10-CM

## 2018-07-20 PROCEDURE — 25010000002 PROPOFOL 10 MG/ML EMULSION: Performed by: ANESTHESIOLOGY

## 2018-07-20 PROCEDURE — 73600 X-RAY EXAM OF ANKLE: CPT

## 2018-07-20 PROCEDURE — 25010000002 FENTANYL CITRATE (PF) 100 MCG/2ML SOLUTION: Performed by: ANESTHESIOLOGY

## 2018-07-20 PROCEDURE — 76000 FLUOROSCOPY <1 HR PHYS/QHP: CPT

## 2018-07-20 PROCEDURE — 20680 REMOVAL OF IMPLANT DEEP: CPT | Performed by: ORTHOPAEDIC SURGERY

## 2018-07-20 PROCEDURE — 25010000002 MIDAZOLAM PER 1 MG: Performed by: ANESTHESIOLOGY

## 2018-07-20 RX ORDER — FAMOTIDINE 10 MG/ML
20 INJECTION, SOLUTION INTRAVENOUS ONCE
Status: COMPLETED | OUTPATIENT
Start: 2018-07-20 | End: 2018-07-20

## 2018-07-20 RX ORDER — ONDANSETRON 2 MG/ML
4 INJECTION INTRAMUSCULAR; INTRAVENOUS ONCE AS NEEDED
Status: CANCELLED | OUTPATIENT
Start: 2018-07-20

## 2018-07-20 RX ORDER — CLINDAMYCIN PHOSPHATE 900 MG/50ML
900 INJECTION INTRAVENOUS ONCE
Status: COMPLETED | OUTPATIENT
Start: 2018-07-20 | End: 2018-07-20

## 2018-07-20 RX ORDER — PROMETHAZINE HYDROCHLORIDE 25 MG/1
25 SUPPOSITORY RECTAL ONCE AS NEEDED
Status: CANCELLED | OUTPATIENT
Start: 2018-07-20

## 2018-07-20 RX ORDER — PROMETHAZINE HYDROCHLORIDE 25 MG/ML
6.25 INJECTION, SOLUTION INTRAMUSCULAR; INTRAVENOUS ONCE AS NEEDED
Status: DISCONTINUED | OUTPATIENT
Start: 2018-07-20 | End: 2018-07-20 | Stop reason: HOSPADM

## 2018-07-20 RX ORDER — LABETALOL HYDROCHLORIDE 5 MG/ML
5 INJECTION, SOLUTION INTRAVENOUS
Status: CANCELLED | OUTPATIENT
Start: 2018-07-20

## 2018-07-20 RX ORDER — HYDROCODONE BITARTRATE AND ACETAMINOPHEN 7.5; 325 MG/1; MG/1
1 TABLET ORAL ONCE AS NEEDED
Status: DISCONTINUED | OUTPATIENT
Start: 2018-07-20 | End: 2018-07-20 | Stop reason: HOSPADM

## 2018-07-20 RX ORDER — EPHEDRINE SULFATE 50 MG/ML
5 INJECTION, SOLUTION INTRAVENOUS ONCE AS NEEDED
Status: CANCELLED | OUTPATIENT
Start: 2018-07-20

## 2018-07-20 RX ORDER — MIDAZOLAM HYDROCHLORIDE 1 MG/ML
2 INJECTION INTRAMUSCULAR; INTRAVENOUS
Status: DISCONTINUED | OUTPATIENT
Start: 2018-07-20 | End: 2018-07-20 | Stop reason: HOSPADM

## 2018-07-20 RX ORDER — HYDROMORPHONE HYDROCHLORIDE 1 MG/ML
0.5 INJECTION, SOLUTION INTRAMUSCULAR; INTRAVENOUS; SUBCUTANEOUS
Status: CANCELLED | OUTPATIENT
Start: 2018-07-20

## 2018-07-20 RX ORDER — PROMETHAZINE HYDROCHLORIDE 25 MG/1
12.5 TABLET ORAL ONCE AS NEEDED
Status: DISCONTINUED | OUTPATIENT
Start: 2018-07-20 | End: 2018-07-20 | Stop reason: HOSPADM

## 2018-07-20 RX ORDER — PROMETHAZINE HYDROCHLORIDE 25 MG/ML
6.25 INJECTION, SOLUTION INTRAMUSCULAR; INTRAVENOUS ONCE AS NEEDED
Status: CANCELLED | OUTPATIENT
Start: 2018-07-20

## 2018-07-20 RX ORDER — SODIUM CHLORIDE, SODIUM LACTATE, POTASSIUM CHLORIDE, CALCIUM CHLORIDE 600; 310; 30; 20 MG/100ML; MG/100ML; MG/100ML; MG/100ML
9 INJECTION, SOLUTION INTRAVENOUS CONTINUOUS
Status: DISCONTINUED | OUTPATIENT
Start: 2018-07-20 | End: 2018-07-20 | Stop reason: HOSPADM

## 2018-07-20 RX ORDER — LABETALOL HYDROCHLORIDE 5 MG/ML
5 INJECTION, SOLUTION INTRAVENOUS
Status: DISCONTINUED | OUTPATIENT
Start: 2018-07-20 | End: 2018-07-20 | Stop reason: HOSPADM

## 2018-07-20 RX ORDER — FENTANYL CITRATE 50 UG/ML
100 INJECTION, SOLUTION INTRAMUSCULAR; INTRAVENOUS
Status: DISCONTINUED | OUTPATIENT
Start: 2018-07-20 | End: 2018-07-20 | Stop reason: HOSPADM

## 2018-07-20 RX ORDER — FLUMAZENIL 0.1 MG/ML
0.2 INJECTION INTRAVENOUS AS NEEDED
Status: CANCELLED | OUTPATIENT
Start: 2018-07-20

## 2018-07-20 RX ORDER — PROMETHAZINE HYDROCHLORIDE 25 MG/ML
12.5 INJECTION, SOLUTION INTRAMUSCULAR; INTRAVENOUS ONCE AS NEEDED
Status: DISCONTINUED | OUTPATIENT
Start: 2018-07-20 | End: 2018-07-20 | Stop reason: HOSPADM

## 2018-07-20 RX ORDER — HYDROCODONE BITARTRATE AND ACETAMINOPHEN 7.5; 325 MG/1; MG/1
1 TABLET ORAL ONCE AS NEEDED
Status: CANCELLED | OUTPATIENT
Start: 2018-07-20

## 2018-07-20 RX ORDER — PROMETHAZINE HYDROCHLORIDE 25 MG/1
25 TABLET ORAL ONCE AS NEEDED
Status: DISCONTINUED | OUTPATIENT
Start: 2018-07-20 | End: 2018-07-20 | Stop reason: HOSPADM

## 2018-07-20 RX ORDER — LIDOCAINE HYDROCHLORIDE 20 MG/ML
INJECTION, SOLUTION INFILTRATION; PERINEURAL AS NEEDED
Status: DISCONTINUED | OUTPATIENT
Start: 2018-07-20 | End: 2018-07-20 | Stop reason: SURG

## 2018-07-20 RX ORDER — FENTANYL CITRATE 50 UG/ML
50 INJECTION, SOLUTION INTRAMUSCULAR; INTRAVENOUS
Status: DISCONTINUED | OUTPATIENT
Start: 2018-07-20 | End: 2018-07-20 | Stop reason: HOSPADM

## 2018-07-20 RX ORDER — LIDOCAINE HYDROCHLORIDE 10 MG/ML
0.5 INJECTION, SOLUTION EPIDURAL; INFILTRATION; INTRACAUDAL; PERINEURAL ONCE AS NEEDED
Status: DISCONTINUED | OUTPATIENT
Start: 2018-07-20 | End: 2018-07-20 | Stop reason: HOSPADM

## 2018-07-20 RX ORDER — DIPHENHYDRAMINE HYDROCHLORIDE 50 MG/ML
6.25 INJECTION INTRAMUSCULAR; INTRAVENOUS
Status: CANCELLED | OUTPATIENT
Start: 2018-07-20

## 2018-07-20 RX ORDER — DIPHENHYDRAMINE HYDROCHLORIDE 50 MG/ML
12.5 INJECTION INTRAMUSCULAR; INTRAVENOUS
Status: DISCONTINUED | OUTPATIENT
Start: 2018-07-20 | End: 2018-07-20 | Stop reason: HOSPADM

## 2018-07-20 RX ORDER — PROMETHAZINE HYDROCHLORIDE 25 MG/1
25 TABLET ORAL ONCE AS NEEDED
Status: CANCELLED | OUTPATIENT
Start: 2018-07-20

## 2018-07-20 RX ORDER — FENTANYL CITRATE 50 UG/ML
50 INJECTION, SOLUTION INTRAMUSCULAR; INTRAVENOUS
Status: CANCELLED | OUTPATIENT
Start: 2018-07-20

## 2018-07-20 RX ORDER — PROMETHAZINE HYDROCHLORIDE 25 MG/1
25 SUPPOSITORY RECTAL ONCE AS NEEDED
Status: DISCONTINUED | OUTPATIENT
Start: 2018-07-20 | End: 2018-07-20 | Stop reason: HOSPADM

## 2018-07-20 RX ORDER — NALOXONE HCL 0.4 MG/ML
0.2 VIAL (ML) INJECTION AS NEEDED
Status: DISCONTINUED | OUTPATIENT
Start: 2018-07-20 | End: 2018-07-20 | Stop reason: HOSPADM

## 2018-07-20 RX ORDER — OXYCODONE HYDROCHLORIDE AND ACETAMINOPHEN 5; 325 MG/1; MG/1
2 TABLET ORAL ONCE AS NEEDED
Status: CANCELLED | OUTPATIENT
Start: 2018-07-20

## 2018-07-20 RX ORDER — OXYCODONE AND ACETAMINOPHEN 7.5; 325 MG/1; MG/1
1 TABLET ORAL ONCE AS NEEDED
Status: DISCONTINUED | OUTPATIENT
Start: 2018-07-20 | End: 2018-07-20 | Stop reason: HOSPADM

## 2018-07-20 RX ORDER — EPHEDRINE SULFATE 50 MG/ML
5 INJECTION, SOLUTION INTRAVENOUS ONCE AS NEEDED
Status: DISCONTINUED | OUTPATIENT
Start: 2018-07-20 | End: 2018-07-20 | Stop reason: HOSPADM

## 2018-07-20 RX ORDER — PROPOFOL 10 MG/ML
VIAL (ML) INTRAVENOUS CONTINUOUS PRN
Status: DISCONTINUED | OUTPATIENT
Start: 2018-07-20 | End: 2018-07-20 | Stop reason: SURG

## 2018-07-20 RX ORDER — MIDAZOLAM HYDROCHLORIDE 1 MG/ML
1 INJECTION INTRAMUSCULAR; INTRAVENOUS
Status: DISCONTINUED | OUTPATIENT
Start: 2018-07-20 | End: 2018-07-20 | Stop reason: HOSPADM

## 2018-07-20 RX ORDER — SODIUM CHLORIDE 0.9 % (FLUSH) 0.9 %
1-10 SYRINGE (ML) INJECTION AS NEEDED
Status: DISCONTINUED | OUTPATIENT
Start: 2018-07-20 | End: 2018-07-20 | Stop reason: HOSPADM

## 2018-07-20 RX ORDER — ONDANSETRON 2 MG/ML
4 INJECTION INTRAMUSCULAR; INTRAVENOUS ONCE AS NEEDED
Status: DISCONTINUED | OUTPATIENT
Start: 2018-07-20 | End: 2018-07-20 | Stop reason: HOSPADM

## 2018-07-20 RX ORDER — BACITRACIN 50000 [IU]/1
INJECTION, POWDER, FOR SOLUTION INTRAMUSCULAR AS NEEDED
Status: DISCONTINUED | OUTPATIENT
Start: 2018-07-20 | End: 2018-07-20 | Stop reason: HOSPADM

## 2018-07-20 RX ADMIN — CLINDAMYCIN PHOSPHATE 900 MG: 900 INJECTION, SOLUTION INTRAVENOUS at 13:03

## 2018-07-20 RX ADMIN — FAMOTIDINE 20 MG: 10 INJECTION, SOLUTION INTRAVENOUS at 12:23

## 2018-07-20 RX ADMIN — MIDAZOLAM 1 MG: 1 INJECTION INTRAMUSCULAR; INTRAVENOUS at 12:23

## 2018-07-20 RX ADMIN — LIDOCAINE HYDROCHLORIDE 50 MG: 20 INJECTION, SOLUTION INFILTRATION; PERINEURAL at 13:00

## 2018-07-20 RX ADMIN — FENTANYL CITRATE 50 MCG: 50 INJECTION INTRAMUSCULAR; INTRAVENOUS at 13:02

## 2018-07-20 RX ADMIN — PROPOFOL 160 MCG/KG/MIN: 10 INJECTION, EMULSION INTRAVENOUS at 13:03

## 2018-07-20 RX ADMIN — SODIUM CHLORIDE, POTASSIUM CHLORIDE, SODIUM LACTATE AND CALCIUM CHLORIDE: 600; 310; 30; 20 INJECTION, SOLUTION INTRAVENOUS at 12:56

## 2018-07-20 RX ADMIN — SODIUM CHLORIDE, POTASSIUM CHLORIDE, SODIUM LACTATE AND CALCIUM CHLORIDE 9 ML/HR: 600; 310; 30; 20 INJECTION, SOLUTION INTRAVENOUS at 12:23

## 2018-07-20 NOTE — OP NOTE
ANKLE/FOOT HARDWARE REMOVAL  Progress Note    Veena Beyer  7/20/2018    Pre-op Diagnosis:   Retained orthopedic hardware [Z96.9]  Status post open reduction with internal fixation (ORIF) of fracture of the left ankle [Z96.7, Z87.81] including open reduction and internal fixation of the syndesmosis       Post-Op Diagnosis Codes:     * Retained orthopedic hardware [Z96.9]     * Status post open reduction with internal fixation (ORIF) of fracture of the left ankle [Z96.7, Z87.81] including open reduction and internal fixation of the syndesmosis    Procedure/CPT® Codes:      Procedure(s):  ANKLE/FOOT HARDWARE REMOVAL--removal of syndesmosis screw from the left ankle under local with sedation, please    Surgeon(s):  Mohinder Key MD    Anesthesia: Monitor Anesthesia Care    Staff:   Circulator: Larissa Cross RN  Radiology Technologist: Nikko Menjivar RRT  Scrub Person: Alina Christine    Estimated Blood Loss: minimal    Urine Voided: * No values recorded between 7/20/2018 12:46 PM and 7/20/2018  1:33 PM *    Specimens:                None      Drains:      Findings: The syndesmosis screw was readily located, and was solidly fixed.  There is no sign of loosening, infection, or other concerning intraoperative findings.  A single mortise image of the ankle was obtained under image intensification and the mortise remained anatomically aligned after the removal of the screw.    Complications: None noted intraoperatively    Technique:  The patient was taken to the operating room and placed comfortably supine on the table.  I had initialed the correct ankle and operative site in the holding area.  Meticulous attention was given to comfortable positioning and careful padding of all extremities and bony prominences.  Sedation was induced by the anesthesia service, and the left lower extremity was prepped and draped in the usual sterile fashion from the knee, distally.    After adequate sedation was induced, I  infiltrated the subcutaneous tissues on the lateral aspect of the ankle in line with the previous incision with appropriate local anesthetic.  A longitudinal incision measuring about 12 to 15 mm in length was then made overlying the location of the head of the syndesmosis screw.  Careful spreading dissection was carried out.  A small elevator was used to expose the head of the screw.  The screw was then carefully extracted.  The screw was found to be intact.  The screw was well fixed.  There was no sign of loosening or infection.  The wound was copiously irrigated, and then approximated in layers using standard techniques.  The skin was carefully cleaned with wet and dry sponges and then a sterile dressing was applied.  All sponge, needle, and equipment counts were reported to the surgeon by the operating room staff to be correct.        Mohinder Key MD     Date: 7/20/2018  Time: 1:39 PM

## 2018-07-20 NOTE — ANESTHESIA POSTPROCEDURE EVALUATION
Patient: Veena Beyer    Procedure Summary     Date:  07/20/18 Room / Location:   [facility]    Anesthesia Start:  1256 Anesthesia Stop:  1342    Procedure:  ANKLE/FOOT HARDWARE REMOVAL--removal of syndesmosis screw from the left ankle under local with sedation, please (Left Ankle) Diagnosis:       Retained orthopedic hardware      Status post open reduction with internal fixation (ORIF) of fracture of ankle      (Retained orthopedic hardware [Z96.9])      (Status post open reduction with internal fixation (ORIF) of fracture of ankle [Z96.7, Z87.81])    Surgeon:  Mohinder Key MD Provider:  Kameron Hand MD    Anesthesia Type:  MAC ASA Status:  3          Anesthesia Type: MAC  Last vitals  BP   141/95 (07/20/18 1135)   Temp   36.6 °C (97.9 °F) (07/20/18 1135)   Pulse   76 (07/20/18 1227)   Resp   20 (07/20/18 1135)     SpO2   100 % (07/20/18 1227)     Post Anesthesia Care and Evaluation    Patient location during evaluation: PACU  Patient participation: complete - patient participated  Level of consciousness: awake and alert and awake  Pain management: adequate  Airway patency: patent  Anesthetic complications: No anesthetic complications    Cardiovascular status: acceptable  Respiratory status: acceptable  Hydration status: acceptable    Comments: /95   Pulse 76   Temp 36.6 °C (97.9 °F) (Oral)   Resp 20   LMP 07/15/2018 (Exact Date)   SpO2 100%

## 2018-07-20 NOTE — BRIEF OP NOTE
ANKLE/FOOT HARDWARE REMOVAL  Progress Note    Veena Beyer  7/20/2018    Pre-op Diagnosis:   Retained orthopedic hardware [Z96.9]  Status post open reduction with internal fixation (ORIF) of fracture of the left ankle [Z96.7, Z87.81] including open reduction and internal fixation of the syndesmosis       Post-Op Diagnosis Codes:     * Retained orthopedic hardware [Z96.9]     * Status post open reduction with internal fixation (ORIF) of fracture of the left ankle [Z96.7, Z87.81] including open reduction and internal fixation of the syndesmosis    Procedure/CPT® Codes:      Procedure(s):  ANKLE/FOOT HARDWARE REMOVAL--removal of syndesmosis screw from the left ankle under local with sedation, please    Surgeon(s):  Mohinder Key MD    Anesthesia: Monitor Anesthesia Care    Staff:   Circulator: Larissa Cross RN  Radiology Technologist: Nikko Menjivar RRT  Scrub Person: Alina Christine    Estimated Blood Loss: minimal    Urine Voided: * No values recorded between 7/20/2018 12:46 PM and 7/20/2018  1:33 PM *    Specimens:                None      Drains:      Findings: The syndesmosis screw was readily located, and was solidly fixed.  There is no sign of loosening, infection, or other concerning intraoperative findings.  A single mortise image of the ankle was obtained under image intensification and the mortise remained anatomically aligned after the removal of the screw.    Complications: None noted intraoperatively      Mohinder Key MD     Date: 7/20/2018  Time: 1:39 PM

## 2018-07-20 NOTE — ANESTHESIA PREPROCEDURE EVALUATION
Anesthesia Evaluation     Patient summary reviewed and Nursing notes reviewed   NPO Solid Status: > 8 hours             Airway   Mallampati: II  TM distance: >3 FB  Neck ROM: full  no difficulty expected  Dental - normal exam     Pulmonary - negative pulmonary ROS and normal exam   Cardiovascular - negative cardio ROS and normal exam        Neuro/Psych- negative ROS  GI/Hepatic/Renal/Endo    (+) morbid obesity,      Musculoskeletal (-) negative ROS    Abdominal  - normal exam   Substance History - negative use     OB/GYN negative ob/gyn ROS         Other                        Anesthesia Plan    ASA 3     MAC     intravenous induction   Anesthetic plan and risks discussed with patient.    Plan discussed with CRNA.

## 2018-07-20 NOTE — H&P (VIEW-ONLY)
Patient:  Veena Beyer is a 37 y.o. female    Chief Complaint/ Reason for Visit:    Chief Complaint   Patient presents with   • Left Ankle - Follow-up       HPI:  This pleasant lady returns today with her  for a postop check on her left ankle.  Tomorrow she will be 11 weeks status post open reduction internal fixation of a displaced fracture of the left ankle including reduction and fixation of her syndesmosis.  She says she has no pain.  She has no calf pain, no chest pain, and no shortness of breath.  She has had no trouble with her incision, and is very pleased with the way it has healed.      PMH:    Past Medical History:   Diagnosis Date   • Ankle fracture, left        PSH:    Past Surgical History:   Procedure Laterality Date   • ANKLE OPEN REDUCTION INTERNAL FIXATION Left 4/6/2018    Procedure: ANKLE OPEN REDUCTION INTERNAL FIXATION;  Surgeon: Mohinder Key MD;  Location: [facility];  Service: Orthopedics   • WISDOM TOOTH EXTRACTION  1997       Social Hx:    Social History     Social History   • Marital status:      Spouse name: N/A   • Number of children: N/A   • Years of education: N/A     Occupational History   • Not on file.     Social History Main Topics   • Smoking status: Never Smoker   • Smokeless tobacco: Never Used   • Alcohol use Yes      Comment: OCC   • Drug use: No   • Sexual activity: Defer     Other Topics Concern   • Not on file     Social History Narrative   • No narrative on file       Family Hx:  History reviewed. No pertinent family history.    Meds:    Current Outpatient Prescriptions:   •  Calcium Carbonate-Vitamin D 600-400 MG-UNIT chewable tablet, Chew 1 tablet., Disp: , Rfl:   •  Prenatal Vit-Fe Fumarate-FA (PRENATAL PO), Take  by mouth., Disp: , Rfl:   •  HYDROcodone-acetaminophen (NORCO) 5-325 MG per tablet, May take 1 or 2 by mouth every 6 hours as needed for pain postoperatively., Disp: 60 tablet, Rfl: 0    Allergies:    Allergies   Allergen Reactions   •  "Penicillins Hives       ROS:  Review of Systems    Vitals:    06/21/18 1112   Temp: 98.2 °F (36.8 °C)   TempSrc: Temporal Artery    Weight: 101 kg (222 lb 9.6 oz)   Height: 154.9 cm (61\")     Body mass index is 42.06 kg/m².    Physical Exam    The patient is awake, alert, and oriented ×3.  The patient is in no acute distress.  Breathing is regular and unlabored with a respiratory rate of 12/m.  Extraocular movements and pupillary responses are symmetrically intact. Sclerae are anicteric.   Hearing is within normal limits.  Speech is within normal limits.  There is no jugular venous distention.    Left ankle: Left ankle looks great.  There is really no significant swelling.  In comparison to the right ankle, there is hardly any difference.  There is just very slight swelling.  The incisions beautifully healed with absolutely no sign of infection.  The patient has a full active functional range of motion.  Her left calf is soft and nontender.  Neurovascular exams intact and normal.        Radiology: X-rays: 3 views of the patient's left ankle were ordered and reviewed today to assess postoperative status and alignment and healing.  I did review these and I did compare them to previous sets of images of the left ankle.  In the office.  Today's images reveal the distal fibular fracture is completely healed.  The mortise appears anatomically aligned.  Hardware is intact and I see no complicating features on these films.          Assessment:     Diagnosis Plan   1. Status post open reduction with internal fixation (ORIF) of fracture of ankle  XR Ankle 3+ View Left    Ambulatory Referral to Physical Therapy    Case Request    clindamycin (CLEOCIN) 900 mg in dextrose (D5W) 5 % 100 mL IVPB    Case Request   2. Status post open reduction and internal fixation (ORIF) of syndesmosis     3. Retained orthopedic hardware  Case Request    clindamycin (CLEOCIN) 900 mg in dextrose (D5W) 5 % 100 mL IVPB    Case Request           Plan: "  I discussed everything with the patient and her .  I answered all their questions.  Activity recommendations, restrictions, and precautions were reviewed and reinforced and they voice understanding.    We reviewed that we need to schedule her to undergo removal of the syndesmosis screw in the next 3 weeks or so.  Were going to start physical therapy as well.    I will see her back for a postop check after we have removed the syndesmosis screw from the left ankle.  They understand that this is a brief, outpatient procedure.      Orders Placed This Encounter   Procedures   • XR Ankle 3+ View Left     Order Specific Question:   Reason for Exam:     Answer:   ankle     Order Specific Question:   Patient Pregnant     Answer:   No   • Ambulatory Referral to Physical Therapy     Referral Priority:   Routine     Referral Type:   Therapy     Referral Reason:   Specialty Services Required     Requested Specialty:   Physical Therapy     Number of Visits Requested:   1   • Follow anesthesia standing orders.   • Provide instructions to patient regarding NPO status   • Obtain informed consent     Order Specific Question:   Informed Consent Given For     Answer:   Removal of syndesmosis screw from left ankle     Order Specific Question:   Laterality     Answer:   Left

## 2018-08-06 ENCOUNTER — OFFICE VISIT (OUTPATIENT)
Dept: ORTHOPEDIC SURGERY | Facility: CLINIC | Age: 38
End: 2018-08-06

## 2018-08-06 VITALS — HEIGHT: 62 IN | BODY MASS INDEX: 40.27 KG/M2 | TEMPERATURE: 98 F | WEIGHT: 218.8 LBS

## 2018-08-06 DIAGNOSIS — Z98.890 S/P HARDWARE REMOVAL: Primary | ICD-10-CM

## 2018-08-06 PROCEDURE — 73610 X-RAY EXAM OF ANKLE: CPT | Performed by: ORTHOPAEDIC SURGERY

## 2018-08-06 PROCEDURE — 99024 POSTOP FOLLOW-UP VISIT: CPT | Performed by: ORTHOPAEDIC SURGERY

## 2018-08-06 NOTE — PROGRESS NOTES
Patient:  Veena Beyer is a 38 y.o. female    Chief Complaint/ Reason for Visit:    Chief Complaint   Patient presents with   • Left Ankle - Post-op Follow-up       HPI:  It's been about 2-1/2 weeks since I removed the syndesmosis screw from this pleasant young lady's left ankle.  She says she is doing great and has had no pain.  Her skin healed beautifully with no problems.  She is having no calf pain, no chest pain, no shortness of breath.  She feels like her gait is improving steadily.      PMH:    Past Medical History:   Diagnosis Date   • Painful orthopaedic hardware (CMS/HCC)     LEFT ANKLE       PSH:    Past Surgical History:   Procedure Laterality Date   • ANKLE OPEN REDUCTION INTERNAL FIXATION Left 4/6/2018    Procedure: ANKLE OPEN REDUCTION INTERNAL FIXATION;  Surgeon: Mohinder Key MD;  Location: [facility];  Service: Orthopedics   • HARDWARE REMOVAL Left 7/20/2018    Procedure: ANKLE/FOOT HARDWARE REMOVAL--removal of syndesmosis screw from the left ankle under local with sedation, please;  Surgeon: Mohinder Key MD;  Location: [facility];  Service: Orthopedics   • WISDOM TOOTH EXTRACTION  1997       Social Hx:    Social History     Social History   • Marital status:      Spouse name: N/A   • Number of children: N/A   • Years of education: N/A     Occupational History   • Not on file.     Social History Main Topics   • Smoking status: Never Smoker   • Smokeless tobacco: Never Used   • Alcohol use Yes      Comment: OCCASIONAL   • Drug use: No   • Sexual activity: Defer     Other Topics Concern   • Not on file     Social History Narrative   • No narrative on file       Family Hx:    Family History   Problem Relation Age of Onset   • Malig Hyperthermia Neg Hx        Meds:    Current Outpatient Prescriptions:   •  Calcium Carbonate-Vitamin D 600-400 MG-UNIT chewable tablet, Chew 1 tablet Daily., Disp: , Rfl:   •  HYDROcodone-acetaminophen (NORCO) 5-325 MG per tablet, Take 1 tablet by  norco refilled, but not the Tramadol.  Please let her know.  She should use Naproxen 2 tablets OTC twice daily and continue gabapentin.   Please let the patient know.  Thank you.     "mouth Every 6 (Six) Hours As Needed., Disp: , Rfl:   •  Prenatal Vit-Fe Fumarate-FA (PRENATAL PO), Take 1 tablet by mouth Daily., Disp: , Rfl:     Allergies:    Allergies   Allergen Reactions   • Penicillins Hives       ROS:  Review of Systems    Vitals:    08/06/18 1138   Temp: 98 °F (36.7 °C)   Weight: 99.2 kg (218 lb 12.8 oz)   Height: 157.5 cm (62\")     Body mass index is 40.02 kg/m².    Physical Exam    The patient is awake, alert, and oriented ×3.  The patient is in no acute distress.  Breathing is regular and unlabored with a respiratory rate of 12/m.  Extraocular movements and pupillary responses are symmetrically intact. Sclerae are anicteric.   Hearing is within normal limits.  Speech is within normal limits.  There is no jugular venous distention.    Left ankle: The incisions beautifully healed.  There is absolutely no sign of infection in her ankle really has no considerable swelling.      Radiology:  X-rays: 3 views of the patient's left ankle were ordered and reviewed today to assess postoperative status and alignment.  Today's images show anatomic alignment of the ankle mortise and all fracture lines are healed.  In comparison to the previous images here in the office, there has been interval removal of the syndesmosis screw, but no change in alignment.      Assessment:     Diagnosis Plan   1. S/P hardware removal  XR Ankle 3+ View Left           Plan:  I discussed everything with the patient at length.  I answered all of her questions.  We are both very pleased with her status at this point.  She's going to continue her rehabilitation and continue gradual advancement of activities.  I want to see her back in about 6 weeks for what will likely be a final check.  If she is doing well at that time, x-rays ARE NOT needed.      Orders Placed This Encounter   Procedures   • XR Ankle 3+ View Left     shielded     Order Specific Question:   Reason for Exam:     Answer:   f/u lt. ankle     Order Specific " Question:   Patient Pregnant     Answer:   No

## 2018-10-03 ENCOUNTER — OFFICE VISIT (OUTPATIENT)
Dept: ORTHOPEDIC SURGERY | Facility: CLINIC | Age: 38
End: 2018-10-03

## 2018-10-03 VITALS — BODY MASS INDEX: 38.91 KG/M2 | WEIGHT: 219.6 LBS | TEMPERATURE: 98.5 F | HEIGHT: 63 IN

## 2018-10-03 DIAGNOSIS — Z98.890 STATUS POST OPEN REDUCTION WITH INTERNAL FIXATION (ORIF) OF FRACTURE OF ANKLE: ICD-10-CM

## 2018-10-03 DIAGNOSIS — S82.62XD CLOSED DISPLACED FRACTURE OF LATERAL MALLEOLUS OF LEFT FIBULA WITH ROUTINE HEALING, SUBSEQUENT ENCOUNTER: Primary | ICD-10-CM

## 2018-10-03 DIAGNOSIS — Z87.81 STATUS POST OPEN REDUCTION WITH INTERNAL FIXATION (ORIF) OF FRACTURE OF ANKLE: ICD-10-CM

## 2018-10-03 PROBLEM — S93.432A ANKLE SYNDESMOSIS DISRUPTION, LEFT, INITIAL ENCOUNTER: Status: RESOLVED | Noted: 2018-04-03 | Resolved: 2018-10-03

## 2018-10-03 PROCEDURE — 99212 OFFICE O/P EST SF 10 MIN: CPT | Performed by: ORTHOPAEDIC SURGERY

## 2018-10-03 RX ORDER — ALPRAZOLAM 0.5 MG/1
TABLET ORAL
COMMUNITY
Start: 2018-08-31

## 2018-11-01 ENCOUNTER — TREATMENT (OUTPATIENT)
Dept: PHYSICAL THERAPY | Facility: CLINIC | Age: 38
End: 2018-11-01

## 2018-11-01 DIAGNOSIS — M25.572 LEFT ANKLE PAIN, UNSPECIFIED CHRONICITY: Primary | ICD-10-CM

## 2018-11-01 DIAGNOSIS — Z98.890 HISTORY OF ANKLE SURGERY: ICD-10-CM

## 2018-11-01 PROCEDURE — 97110 THERAPEUTIC EXERCISES: CPT | Performed by: PHYSICAL THERAPIST

## 2018-11-01 PROCEDURE — 97162 PT EVAL MOD COMPLEX 30 MIN: CPT | Performed by: PHYSICAL THERAPIST

## 2018-11-01 NOTE — PROGRESS NOTES
Physical Therapy Initial Evaluation and Plan of Care    Patient: Veena Beyer   : 1980  Diagnosis/ICD-10 Code:  Left ankle pain, unspecified chronicity [M25.572]  Referring practitioner: Mohinder Key MD  Past Medical History Reviewed: 2018    PLOF: Independent and has young daughter    Subjective Evaluation    History of Present Illness  Date of surgery: 2018  Mechanism of injury: I missed a bottom step and then had surgery on the ankle. Last surgery was in July. MD says looking good. Right now I am having ankle and foot pain. My main pain is in my right foot mainly in standing for over 3 hours. I do fascia rolling on the bottom of my foot. I am having some right sided pain.   No nerve pain.   I went to PT for evaluation 3 weeks ago and they gave HEP for my ankle.  I like to hike and walk on uneven surfaces.         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     Neurological Testing     Sensation     Ankle/Foot   Left Ankle/Foot   Intact: light touch    Right Ankle/Foot   Intact: light touch     Active Range of Motion   Left Ankle/Foot   Dorsiflexion (ke): 5 degrees   Inversion: 40 degrees   Eversion: 10 degrees     Right Ankle/Foot   Dorsiflexion (ke): 10 degrees   Inversion: 40 degrees   Eversion: 25 degrees     Strength/Myotome Testing     Lumbar     Right   Normal strength    Left Hip   Planes of Motion   Flexion: 5  Extension: 4  Abduction: 4-  External rotation: 5  Internal rotation: 5    Left Knee   Flexion: 5  Extension: 5    Left Ankle/Foot   Normal strength  Dorsiflexion:  5    Right Ankle/Foot   Normal strength    Ambulation     Quality of Movement During Gait     Pelvis    Pelvis (Left): Positive Trendelenburg.     Functional Assessment     Single Leg Stance   Left: 2 seconds  Right: 15 seconds         Assessment & Plan     Assessment  Impairments: abnormal gait, abnormal or restricted ROM, impaired balance, impaired physical strength and lacks appropriate home exercise program  Assessment details: Pt doing excellent after her recent left ankle surgery in July. She has good ROM with only minimal end range restrictions and minimal swelling. Pt has left sided hip weakness, some gait abnormalities and difficulty with balance. Pt would benefit from PT services to address theses deficits to get pt back to goals of hiking and walking on uneven surfaces safely  Prognosis: good  Prognosis details: SHORT TERM GOALS: 4-6 visits  1. Pt will be compliant with HEP  2. Pt will be able to balance on even ground single leg (L) for 10 sec due to improved ankle and hip stability.  3. Pt will be able to ambulate without Trendelenburg due to improved hip strength    LONG TERM GOALS: 8-10  1. Pt will score 50/68 or greater on LEFS  2. Pt will have 4+/5 hip ABD and hip extensor stretch on the LLE in order to improve gait and balance  3. Pt will be able to SLS on (L) on uneven surface for 10 sec or greater due to improved ankle and hip stability  Functional Limitations: walking and standing  Plan  Therapy options: will be seen for skilled physical therapy services  Planned modality interventions: cryotherapy, electrical stimulation/Russian stimulation and thermotherapy (hydrocollator packs)  Planned therapy interventions: abdominal trunk stabilization, ADL retraining, balance/weight-bearing training, flexibility, functional ROM exercises, home exercise program, joint mobilization, manual therapy, neuromuscular re-education, soft tissue mobilization, strengthening, stretching and therapeutic  activities  Duration in visits: 10  Treatment plan discussed with: patient        Manual Therapy:    -     mins  47030;  Therapeutic Exercise:    10     mins  46249;     Neuromuscular Sajan:    -    mins  40641;    Therapeutic Activity:     -     mins  08964;     Gait Training:      -     mins  38422;     Ultrasound:     -     mins  54841;    Electrical Stimulation:    -     mins  46873 ( );  Dry Needling     -     mins self-pay    Timed Treatment:   10   mins   Total Treatment:     45   mins      PT SIGNATURE: Alba eTmpleton, PT   DATE TREATMENT INITIATED: 11/2/2018    Initial Certification  Certification Period: 1/31/2019  I certify that the therapy services are furnished while this patient is under my care.  The services outlined above are required by this patient, and will be reviewed every 90 days.     PHYSICIAN: Mohinder Key MD      DATE:     Please sign and return via fax to [phone].. Thank you, [facility].

## 2018-11-03 ENCOUNTER — OFFICE VISIT (OUTPATIENT)
Dept: RETAIL CLINIC | Facility: CLINIC | Age: 38
End: 2018-11-03

## 2018-11-03 VITALS
OXYGEN SATURATION: 98 % | DIASTOLIC BLOOD PRESSURE: 86 MMHG | SYSTOLIC BLOOD PRESSURE: 138 MMHG | HEART RATE: 94 BPM | TEMPERATURE: 98.5 F | RESPIRATION RATE: 18 BRPM

## 2018-11-03 DIAGNOSIS — J40 BRONCHITIS: Primary | ICD-10-CM

## 2018-11-03 PROCEDURE — 99213 OFFICE O/P EST LOW 20 MIN: CPT | Performed by: NURSE PRACTITIONER

## 2018-11-03 PROCEDURE — 94640 AIRWAY INHALATION TREATMENT: CPT | Performed by: NURSE PRACTITIONER

## 2018-11-03 RX ORDER — AZITHROMYCIN 250 MG/1
TABLET, FILM COATED ORAL
Qty: 6 TABLET | Refills: 0 | Status: SHIPPED | OUTPATIENT
Start: 2018-11-03

## 2018-11-03 RX ORDER — IPRATROPIUM BROMIDE AND ALBUTEROL SULFATE 2.5; .5 MG/3ML; MG/3ML
3 SOLUTION RESPIRATORY (INHALATION)
Status: SHIPPED | OUTPATIENT
Start: 2018-11-03

## 2018-11-03 RX ORDER — ALBUTEROL SULFATE 90 UG/1
2 AEROSOL, METERED RESPIRATORY (INHALATION) EVERY 4 HOURS PRN
Qty: 1 INHALER | Refills: 0 | Status: SHIPPED | OUTPATIENT
Start: 2018-11-03 | End: 2018-12-03

## 2018-11-03 RX ORDER — PREDNISONE 20 MG/1
20 TABLET ORAL 2 TIMES DAILY
Qty: 10 TABLET | Refills: 0 | Status: SHIPPED | OUTPATIENT
Start: 2018-11-03

## 2018-11-03 RX ORDER — GUAIFENESIN 600 MG/1
600 TABLET, EXTENDED RELEASE ORAL 2 TIMES DAILY
Qty: 28 TABLET | Refills: 0 | Status: SHIPPED | OUTPATIENT
Start: 2018-11-03 | End: 2018-11-17

## 2018-11-03 RX ADMIN — IPRATROPIUM BROMIDE AND ALBUTEROL SULFATE 3 ML: 2.5; .5 SOLUTION RESPIRATORY (INHALATION) at 15:55

## 2018-11-03 NOTE — PROGRESS NOTES
Subjective:     Veena Beyer is a 38 y.o.     Cough   This is a new problem. The current episode started 1 to 4 weeks ago. The cough is productive of sputum. Associated symptoms include ear congestion, a fever, postnasal drip, a sore throat and wheezing (right). Pertinent negatives include no nasal congestion or shortness of breath. Associated symptoms comments: Hears noise on right side and that is concerning her. She has tried nothing (chlorphineramine) for the symptoms. The treatment provided mild relief.         The following portions of the patient's history were reviewed and updated as appropriate: allergies, current medications, past family history, past medical history, past social history, past surgical history and problem list.      Review of Systems   Constitutional: Positive for fever.   HENT: Positive for postnasal drip and sore throat. Negative for congestion, sinus pain and sneezing.    Respiratory: Positive for cough and wheezing (right). Negative for shortness of breath.    Cardiovascular: Negative.          Objective:      Physical Exam   Constitutional:   Cough frequently t/o visit   HENT:   Head: Normocephalic and atraumatic.   Right Ear: Ear canal normal. Right ear middle ear effusion: mild serous.   Left Ear: Ear canal normal. Left ear middle ear effusion: mild serous.   Nose: Nose normal.   Mouth/Throat: Posterior oropharyngeal erythema (mild) present. No oropharyngeal exudate.   Cardiovascular: Normal rate, regular rhythm, S1 normal, S2 normal and normal heart sounds.    Pulmonary/Chest: She has rhonchi in the right upper field and the left upper field. She has rales in the right lower field.   Rales dissipate post neb tx   Lymphadenopathy:     She has no cervical adenopathy.   Vitals reviewed.          Diagnoses and all orders for this visit:    Bronchitis  -     ipratropium-albuterol (DUO-NEB) nebulizer solution 3 mL; Take 3 mL by nebulization 4 (Four) Times a Day.    Other orders  -      azithromycin (ZITHROMAX) 250 MG tablet; Take 2 tablets the first day, then 1 tablet daily for 4 days.  -     predniSONE (DELTASONE) 20 MG tablet; Take 1 tablet by mouth 2 (Two) Times a Day.  -     albuterol (PROVENTIL HFA;VENTOLIN HFA) 108 (90 Base) MCG/ACT inhaler; Inhale 2 puffs Every 4 (Four) Hours As Needed for Wheezing for up to 30 days.  -     guaiFENesin (MUCINEX) 600 MG 12 hr tablet; Take 1 tablet by mouth 2 (Two) Times a Day for 14 days.

## 2018-11-09 ENCOUNTER — TREATMENT (OUTPATIENT)
Dept: PHYSICAL THERAPY | Facility: CLINIC | Age: 38
End: 2018-11-09

## 2018-11-09 DIAGNOSIS — Z98.890 HISTORY OF ANKLE SURGERY: ICD-10-CM

## 2018-11-09 DIAGNOSIS — M25.572 LEFT ANKLE PAIN, UNSPECIFIED CHRONICITY: Primary | ICD-10-CM

## 2018-11-09 PROCEDURE — 97112 NEUROMUSCULAR REEDUCATION: CPT | Performed by: PHYSICAL THERAPIST

## 2018-11-09 PROCEDURE — 97110 THERAPEUTIC EXERCISES: CPT | Performed by: PHYSICAL THERAPIST

## 2018-11-09 NOTE — PROGRESS NOTES
Physical Therapy Daily Progress Note  Visit: 2    Veena Beyer reports: I am doing ok. I do not have the papers anymore so I need another printout. I am currently getting over bronchitis so I am a little winded    Subjective     Objective   See Exercise, Manual, and Modality Logs for complete treatment.       Assessment & Plan     Assessment  Assessment details: Pt tolerated treatment well. Reported some posterior impingement with single leg balancing, therefore performed double leg standing exercises. Will progress to single leg balance as strength and stability improves    Plan  Plan details: Progress as able with strength and stability in standing. Start with ultrasound next visit        Manual Therapy:    5     mins  91834;  Therapeutic Exercise:    30     mins  04449;     Neuromuscular Sajan:    10    mins  03160;    Therapeutic Activity:     -     mins  42892;     Gait Training:      -     mins  93622;     Ultrasound:     -     mins  11104;    Electrical Stimulation:    -     mins  73514 ( );  Dry Needling     -     mins self-pay    Timed Treatment:   45   mins   Total Treatment:     60   mins    Alba Templeton PT  [place] License #: [id number]    Physical Therapist

## 2018-11-14 ENCOUNTER — TREATMENT (OUTPATIENT)
Dept: PHYSICAL THERAPY | Facility: CLINIC | Age: 38
End: 2018-11-14

## 2018-11-14 DIAGNOSIS — Z98.890 HISTORY OF ANKLE SURGERY: ICD-10-CM

## 2018-11-14 DIAGNOSIS — M25.572 LEFT ANKLE PAIN, UNSPECIFIED CHRONICITY: Primary | ICD-10-CM

## 2018-11-14 PROCEDURE — 97112 NEUROMUSCULAR REEDUCATION: CPT | Performed by: PHYSICAL THERAPIST

## 2018-11-14 PROCEDURE — 97110 THERAPEUTIC EXERCISES: CPT | Performed by: PHYSICAL THERAPIST

## 2018-11-14 PROCEDURE — 97140 MANUAL THERAPY 1/> REGIONS: CPT | Performed by: PHYSICAL THERAPIST

## 2018-11-14 NOTE — PROGRESS NOTES
Physical Therapy Daily Progress Note  Visits:3    Subjective : Veena Beyer reports: The ankle is feeling good. I have been working on balancing at home    Objective:   See Exercise, Manual, and Modality Logs for complete treatment.     Assessment/Plan:Pt doing well. Continues to have -pain with single leg balance on foam, but did ok with balance on shuttleboard. Continue to work on dynamic balance and stability of L ankle, as well as hip strengthening  Progress strengthening /stabilization /functional activity         Manual Therapy:    10     mins  77099;  Therapeutic Exercise:    32     mins  84330;     Neuromuscular Sajan:    10    mins  75572;    Therapeutic Activity:     -     mins  45811;     Gait Training:      -     mins  11122;     Ultrasound:     -     mins  51157;    Electrical Stimulation:    -     mins  12252 ( );  Dry Needling     -     mins self-pay    Timed Treatment:   52   mins   Total Treatment:     60   mins    Treatment this date performed by Piper Nino, PT student.      I was present in the PT department guiding the student by approving, concurring and confirming the skilled judgement for all services rendered.      Alba Templeton, PT  [place] License #[id number]    Physical Therapist

## 2018-11-26 ENCOUNTER — TREATMENT (OUTPATIENT)
Dept: PHYSICAL THERAPY | Facility: CLINIC | Age: 38
End: 2018-11-26

## 2018-11-26 DIAGNOSIS — Z98.890 HISTORY OF ANKLE SURGERY: ICD-10-CM

## 2018-11-26 DIAGNOSIS — M25.572 LEFT ANKLE PAIN, UNSPECIFIED CHRONICITY: Primary | ICD-10-CM

## 2018-11-26 PROCEDURE — 97140 MANUAL THERAPY 1/> REGIONS: CPT | Performed by: PHYSICAL THERAPIST

## 2018-11-26 PROCEDURE — 97110 THERAPEUTIC EXERCISES: CPT | Performed by: PHYSICAL THERAPIST

## 2018-11-26 PROCEDURE — 97112 NEUROMUSCULAR REEDUCATION: CPT | Performed by: PHYSICAL THERAPIST

## 2018-11-26 NOTE — PROGRESS NOTES
Physical Therapy Daily Progress Note  Visit: 4    Veena Beyer reports: My ankle feels good. I still have pain with some things.    Subjective     Objective   See Exercise, Manual, and Modality Logs for complete treatment.       Assessment/Plan  Pt tolerated treatment well overall. She experienced sharp pain with step up exercise on BOSU ball so exercise was discontinued. Pt was able to tolerated SLS on LLE without pain this visit. Pt still demonstrates mild instability with balance training.   Plan: continue to progress strengthening/stability/funtional activities per pt tolerance.    Manual Therapy:    10     mins  53214;  Therapeutic Exercise:    25     mins  04900;     Neuromuscular Sajan:    10    mins  57053;    Therapeutic Activity:     -     mins  78844;     Gait Training:      -     mins  44566;     Ultrasound:     -     mins  46952;    Electrical Stimulation:    -     mins  43029 ( );  Dry Needling     -     mins self-pay    Timed Treatment:   45   mins   Total Treatment:     55   mins    Alba Templeton PT  [place] License #: [id number]    Physical Therapist

## 2018-11-28 ENCOUNTER — TREATMENT (OUTPATIENT)
Dept: PHYSICAL THERAPY | Facility: CLINIC | Age: 38
End: 2018-11-28

## 2018-11-28 DIAGNOSIS — Z98.890 HISTORY OF ANKLE SURGERY: ICD-10-CM

## 2018-11-28 DIAGNOSIS — M25.572 LEFT ANKLE PAIN, UNSPECIFIED CHRONICITY: Primary | ICD-10-CM

## 2018-11-28 PROCEDURE — 97110 THERAPEUTIC EXERCISES: CPT | Performed by: PHYSICAL THERAPIST

## 2018-11-28 PROCEDURE — 97112 NEUROMUSCULAR REEDUCATION: CPT | Performed by: PHYSICAL THERAPIST

## 2018-11-28 PROCEDURE — 97140 MANUAL THERAPY 1/> REGIONS: CPT | Performed by: PHYSICAL THERAPIST

## 2018-11-28 NOTE — PROGRESS NOTES
Physical Therapy Daily Progress Note  Visit: 5    Veena Beyer reports: Up until I got up and started walking around I was sore. But I feel better now. I was feeling our work out.     Subjective     Objective   See Exercise, Manual, and Modality Logs for complete treatment.       Assessment/Plan   Pt tolerated treatment well. She was able to perform step ups and balance on BOSU without pain in the ankle. Pt requested to use heat at start of treatment instead of doing nustep. Pt was sore after last visit so pt was not progressed today.   Plan: progress strengthening/balance activities per pt tolerance. Add nustep to end of treatment.     Manual Therapy:    10     mins  87415;  Therapeutic Exercise:    14     mins  27092;     Neuromuscular Sajan:    10    mins  66316;    Therapeutic Activity:     -     mins  87438;     Gait Training:      -     mins  03300;     Ultrasound:     -     mins  58544;    Electrical Stimulation:    -     mins  23547 ( );  Dry Needling     -     mins self-pay    Timed Treatment:   34   mins   Total Treatment:     54   mins    Alba Templeton, PT  [place] License #: [id number]    Physical Therapist

## 2018-12-10 ENCOUNTER — TREATMENT (OUTPATIENT)
Dept: PHYSICAL THERAPY | Facility: CLINIC | Age: 38
End: 2018-12-10

## 2018-12-10 DIAGNOSIS — M25.572 LEFT ANKLE PAIN, UNSPECIFIED CHRONICITY: Primary | ICD-10-CM

## 2018-12-10 DIAGNOSIS — Z98.890 HISTORY OF ANKLE SURGERY: ICD-10-CM

## 2018-12-10 PROCEDURE — 97110 THERAPEUTIC EXERCISES: CPT | Performed by: PHYSICAL THERAPIST

## 2018-12-10 PROCEDURE — 97112 NEUROMUSCULAR REEDUCATION: CPT | Performed by: PHYSICAL THERAPIST

## 2018-12-10 PROCEDURE — 97140 MANUAL THERAPY 1/> REGIONS: CPT | Performed by: PHYSICAL THERAPIST

## 2018-12-10 NOTE — PROGRESS NOTES
Physical Therapy Daily Progress Note  Visit: 6    Veena Beyer reports: Up the back of my calf is really sore. I had a friend do a massage on Saturday and it started the next day. The stretches have seemed to help with it.     Subjective     Objective   See Exercise, Manual, and Modality Logs for complete treatment.       Assessment/Plan   Pt tolerated treatment well. She demonstrated better stability requring less UE support with balance training. She still experiences a sharp pain in the back of her ankle with step ups on BOSU.   Plan: continue to progress strengthening/balance/functional activities per pt tolerance.    Manual Therapy:    10     mins  07512;  Therapeutic Exercise:    25     mins  16652;     Neuromuscular Sajan:    10    mins  41088;    Therapeutic Activity:     -     mins  71216;     Gait Training:      -     mins  57927;     Ultrasound:     -     mins  99292;    Electrical Stimulation:    -     mins  26399 ( );  Dry Needling     -     mins self-pay    Timed Treatment:   45   mins   Total Treatment:     59   mins    Alba Templeton, PT  [place] License #: [id number]    Physical Therapist

## 2018-12-12 ENCOUNTER — TREATMENT (OUTPATIENT)
Dept: PHYSICAL THERAPY | Facility: CLINIC | Age: 38
End: 2018-12-12

## 2018-12-12 DIAGNOSIS — M25.572 LEFT ANKLE PAIN, UNSPECIFIED CHRONICITY: Primary | ICD-10-CM

## 2018-12-12 DIAGNOSIS — Z98.890 HISTORY OF ANKLE SURGERY: ICD-10-CM

## 2018-12-12 PROCEDURE — 97112 NEUROMUSCULAR REEDUCATION: CPT | Performed by: PHYSICAL THERAPIST

## 2018-12-12 PROCEDURE — 97110 THERAPEUTIC EXERCISES: CPT | Performed by: PHYSICAL THERAPIST

## 2018-12-12 PROCEDURE — 97530 THERAPEUTIC ACTIVITIES: CPT | Performed by: PHYSICAL THERAPIST

## 2018-12-12 NOTE — PROGRESS NOTES
Physical Therapy Daily Progress Note  Visit: 7    Veena Beyer reports: A little sore when I left here last visit, but then fine after the ice. Both of my knees are hurting. My left hip feels tight    Subjective     Objective   See Exercise, Manual, and Modality Logs for complete treatment.       Assessment & Plan     Assessment  Assessment details: Pt doing well. Able to perform step ups and single leg balance activities without pain due to improved stability of L ankle. Educated pt on L hip stretches to help with pain in low back    Plan  Plan details: Decrease frequency to 1x/wk for 3 weeks        Manual Therapy:    6     mins  01622;  Therapeutic Exercise:    25     mins  38013;     Neuromuscular Sajan:    10    mins  55599;    Therapeutic Activity:     10     mins  57827;     Gait Training:      -     mins  37735;     Ultrasound:     -     mins  25182;    Electrical Stimulation:    -     mins  66766 ( );  Dry Needling     -     mins self-pay    Timed Treatment:   51   mins   Total Treatment:     65   mins    Alba Templeton PT  [place] License #: [id number]    Physical Therapist

## 2019-04-04 ENCOUNTER — DOCUMENTATION (OUTPATIENT)
Dept: PHYSICAL THERAPY | Facility: CLINIC | Age: 39
End: 2019-04-04

## 2019-04-04 NOTE — PROGRESS NOTES
Pt being Dc'd from PT at this time due to not being seen for over 30 days.    Alba Templeton  License #: [id number]

## (undated) DEVICE — APPL CHLORAPREP W/TINT 26ML ORNG

## (undated) DEVICE — TUBING, SUCTION, 1/4" X 20', STRAIGHT: Brand: MEDLINE INDUSTRIES, INC.

## (undated) DEVICE — SUT VIC 3/0 SH 27IN J416H

## (undated) DEVICE — DRSNG SURESITE WNDW 2.38X2.75

## (undated) DEVICE — SPNG GZ WOVN 4X4IN 12PLY 10/BX STRL

## (undated) DEVICE — TOWEL,OR,DSP,ST,BLUE,STD,4/PK,20PK/CS: Brand: MEDLINE

## (undated) DEVICE — Device

## (undated) DEVICE — GLV SURG TRIUMPH CLASSIC PF LTX 8 STRL

## (undated) DEVICE — ENCORE® LATEX ORTHO SIZE 8, STERILE LATEX POWDER-FREE SURGICAL GLOVE: Brand: ENCORE

## (undated) DEVICE — UNDERCAST PADDING: Brand: DEROYAL

## (undated) DEVICE — SKIN PREP TRAY W/CHG: Brand: MEDLINE INDUSTRIES, INC.

## (undated) DEVICE — ADHS SKIN DERMABOND TOP ADVANCED

## (undated) DEVICE — DRSNG TELFA PAD NONADH STR 1S 3X4IN

## (undated) DEVICE — PAD,ABDOMINAL,8"X10",ST,LF: Brand: MEDLINE

## (undated) DEVICE — STCKNT IMPERV 9X36IN STRL

## (undated) DEVICE — UNDYED BRAIDED (POLYGLACTIN 910), SYNTHETIC ABSORBABLE SUTURE: Brand: COATED VICRYL

## (undated) DEVICE — BNDG ELAS ELITE V/CLOSE 6IN 5YD LF STRL

## (undated) DEVICE — BNDG ELAS ELITE V/CLOSE 4IN 5YD LF STRL

## (undated) DEVICE — CONN TBG Y 5 IN 1 LF STRL

## (undated) DEVICE — DRSNG TELFA PAD NONADH STR 1S 3X8IN

## (undated) DEVICE — PAD CAST SOF ROL NS 6IN

## (undated) DEVICE — PK ORTHO MINOR TOWER 40

## (undated) DEVICE — DRP C/ARM 41X74IN

## (undated) DEVICE — SCRW CORT S/TAP 3.5X55MM
Type: IMPLANTABLE DEVICE | Site: ANKLE | Status: NON-FUNCTIONAL
Removed: 2018-04-06

## (undated) DEVICE — TP CAST SCOTCHCAST PLS 4IN 4YD BLU

## (undated) DEVICE — BIT DRL QC DIA 2.5X110MM

## (undated) DEVICE — SCRW CORT S/TAP 2.7X18MM
Type: IMPLANTABLE DEVICE | Site: ANKLE | Status: NON-FUNCTIONAL
Removed: 2018-04-06